# Patient Record
Sex: FEMALE | Race: WHITE | NOT HISPANIC OR LATINO | Employment: UNEMPLOYED | URBAN - NONMETROPOLITAN AREA
[De-identification: names, ages, dates, MRNs, and addresses within clinical notes are randomized per-mention and may not be internally consistent; named-entity substitution may affect disease eponyms.]

---

## 2023-01-29 ENCOUNTER — APPOINTMENT (OUTPATIENT)
Dept: GENERAL RADIOLOGY | Facility: HOSPITAL | Age: 83
DRG: 871 | End: 2023-01-29
Payer: MEDICARE

## 2023-01-29 ENCOUNTER — APPOINTMENT (OUTPATIENT)
Dept: CT IMAGING | Facility: HOSPITAL | Age: 83
DRG: 871 | End: 2023-01-29
Payer: MEDICARE

## 2023-01-29 ENCOUNTER — HOSPITAL ENCOUNTER (INPATIENT)
Facility: HOSPITAL | Age: 83
LOS: 5 days | Discharge: NURSING, REHAB OR LONG-TERM CARE | DRG: 871 | End: 2023-02-03
Attending: STUDENT IN AN ORGANIZED HEALTH CARE EDUCATION/TRAINING PROGRAM | Admitting: INTERNAL MEDICINE
Payer: MEDICARE

## 2023-01-29 DIAGNOSIS — G93.41 ACUTE METABOLIC ENCEPHALOPATHY: ICD-10-CM

## 2023-01-29 DIAGNOSIS — U07.1 COVID-19: ICD-10-CM

## 2023-01-29 DIAGNOSIS — N30.01 ACUTE CYSTITIS WITH HEMATURIA: ICD-10-CM

## 2023-01-29 DIAGNOSIS — H60.501 ACUTE OTITIS EXTERNA OF RIGHT EAR, UNSPECIFIED TYPE: Primary | ICD-10-CM

## 2023-01-29 PROBLEM — N39.0 UTI (URINARY TRACT INFECTION): Status: ACTIVE | Noted: 2023-01-29

## 2023-01-29 LAB
ACETONE BLD QL: NEGATIVE
ALBUMIN SERPL-MCNC: 2.8 G/DL (ref 3.5–5.2)
ALBUMIN SERPL-MCNC: 3.3 G/DL (ref 3.5–5.2)
ALBUMIN/GLOB SERPL: 0.7 G/DL
ALBUMIN/GLOB SERPL: 0.9 G/DL
ALP SERPL-CCNC: 63 U/L (ref 39–117)
ALP SERPL-CCNC: 75 U/L (ref 39–117)
ALT SERPL W P-5'-P-CCNC: 6 U/L (ref 1–33)
ALT SERPL W P-5'-P-CCNC: <5 U/L (ref 1–33)
ANION GAP SERPL CALCULATED.3IONS-SCNC: 11.3 MMOL/L (ref 5–15)
ANION GAP SERPL CALCULATED.3IONS-SCNC: 14.8 MMOL/L (ref 5–15)
APTT PPP: 22.3 SECONDS (ref 26.5–34.5)
AST SERPL-CCNC: 15 U/L (ref 1–32)
AST SERPL-CCNC: 18 U/L (ref 1–32)
BACTERIA UR QL AUTO: ABNORMAL /HPF
BASOPHILS # BLD AUTO: 0 10*3/MM3 (ref 0–0.2)
BASOPHILS # BLD AUTO: 0.01 10*3/MM3 (ref 0–0.2)
BASOPHILS NFR BLD AUTO: 0 % (ref 0–1.5)
BASOPHILS NFR BLD AUTO: 0.1 % (ref 0–1.5)
BILIRUB SERPL-MCNC: 0.4 MG/DL (ref 0–1.2)
BILIRUB SERPL-MCNC: 0.7 MG/DL (ref 0–1.2)
BILIRUB UR QL STRIP: NEGATIVE
BUN SERPL-MCNC: 15 MG/DL (ref 8–23)
BUN SERPL-MCNC: 16 MG/DL (ref 8–23)
BUN/CREAT SERPL: 17.6 (ref 7–25)
BUN/CREAT SERPL: 18.8 (ref 7–25)
CALCIUM SPEC-SCNC: 8.8 MG/DL (ref 8.6–10.5)
CALCIUM SPEC-SCNC: 9.1 MG/DL (ref 8.6–10.5)
CHLORIDE SERPL-SCNC: 95 MMOL/L (ref 98–107)
CHLORIDE SERPL-SCNC: 96 MMOL/L (ref 98–107)
CK SERPL-CCNC: 282 U/L (ref 20–180)
CLARITY UR: ABNORMAL
CO2 SERPL-SCNC: 25.2 MMOL/L (ref 22–29)
CO2 SERPL-SCNC: 28.7 MMOL/L (ref 22–29)
COLOR UR: YELLOW
CREAT SERPL-MCNC: 0.85 MG/DL (ref 0.57–1)
CREAT SERPL-MCNC: 0.85 MG/DL (ref 0.57–1)
CRP SERPL-MCNC: 14.58 MG/DL (ref 0–0.5)
CRP SERPL-MCNC: 16.58 MG/DL (ref 0–0.5)
D-LACTATE SERPL-SCNC: 1.4 MMOL/L (ref 0.5–2)
D-LACTATE SERPL-SCNC: 2.1 MMOL/L (ref 0.5–2)
D-LACTATE SERPL-SCNC: 2.3 MMOL/L (ref 0.5–2)
DEPRECATED RDW RBC AUTO: 43.7 FL (ref 37–54)
DEPRECATED RDW RBC AUTO: 44.6 FL (ref 37–54)
EGFRCR SERPLBLD CKD-EPI 2021: 68.9 ML/MIN/1.73
EGFRCR SERPLBLD CKD-EPI 2021: 68.9 ML/MIN/1.73
EOSINOPHIL # BLD AUTO: 0 10*3/MM3 (ref 0–0.4)
EOSINOPHIL # BLD AUTO: 0.01 10*3/MM3 (ref 0–0.4)
EOSINOPHIL NFR BLD AUTO: 0 % (ref 0.3–6.2)
EOSINOPHIL NFR BLD AUTO: 0.1 % (ref 0.3–6.2)
ERYTHROCYTE [DISTWIDTH] IN BLOOD BY AUTOMATED COUNT: 12.5 % (ref 12.3–15.4)
ERYTHROCYTE [DISTWIDTH] IN BLOOD BY AUTOMATED COUNT: 12.5 % (ref 12.3–15.4)
FLUAV RNA RESP QL NAA+PROBE: NOT DETECTED
FLUBV RNA RESP QL NAA+PROBE: NOT DETECTED
FOLATE SERPL-MCNC: 17.6 NG/ML (ref 4.78–24.2)
GLOBULIN UR ELPH-MCNC: 3.6 GM/DL
GLOBULIN UR ELPH-MCNC: 3.9 GM/DL
GLUCOSE BLDC GLUCOMTR-MCNC: 182 MG/DL (ref 70–130)
GLUCOSE BLDC GLUCOMTR-MCNC: 236 MG/DL (ref 70–130)
GLUCOSE BLDC GLUCOMTR-MCNC: 247 MG/DL (ref 70–130)
GLUCOSE BLDC GLUCOMTR-MCNC: 299 MG/DL (ref 70–130)
GLUCOSE SERPL-MCNC: 214 MG/DL (ref 65–99)
GLUCOSE SERPL-MCNC: 224 MG/DL (ref 65–99)
GLUCOSE UR STRIP-MCNC: ABNORMAL MG/DL
HBA1C MFR BLD: 7.6 % (ref 4.8–5.6)
HCT VFR BLD AUTO: 42 % (ref 34–46.6)
HCT VFR BLD AUTO: 42.7 % (ref 34–46.6)
HGB BLD-MCNC: 13.9 G/DL (ref 12–15.9)
HGB BLD-MCNC: 14.7 G/DL (ref 12–15.9)
HGB UR QL STRIP.AUTO: ABNORMAL
HYALINE CASTS UR QL AUTO: ABNORMAL /LPF
IMM GRANULOCYTES # BLD AUTO: 0.03 10*3/MM3 (ref 0–0.05)
IMM GRANULOCYTES # BLD AUTO: 0.04 10*3/MM3 (ref 0–0.05)
IMM GRANULOCYTES NFR BLD AUTO: 0.3 % (ref 0–0.5)
IMM GRANULOCYTES NFR BLD AUTO: 0.4 % (ref 0–0.5)
INR PPP: 1.04 (ref 0.9–1.1)
KETONES UR QL STRIP: ABNORMAL
LEUKOCYTE ESTERASE UR QL STRIP.AUTO: ABNORMAL
LYMPHOCYTES # BLD AUTO: 0.8 10*3/MM3 (ref 0.7–3.1)
LYMPHOCYTES # BLD AUTO: 0.8 10*3/MM3 (ref 0.7–3.1)
LYMPHOCYTES NFR BLD AUTO: 7.1 % (ref 19.6–45.3)
LYMPHOCYTES NFR BLD AUTO: 8 % (ref 19.6–45.3)
MAGNESIUM SERPL-MCNC: 1.6 MG/DL (ref 1.6–2.4)
MCH RBC QN AUTO: 32.1 PG (ref 26.6–33)
MCH RBC QN AUTO: 32.7 PG (ref 26.6–33)
MCHC RBC AUTO-ENTMCNC: 33.1 G/DL (ref 31.5–35.7)
MCHC RBC AUTO-ENTMCNC: 34.4 G/DL (ref 31.5–35.7)
MCV RBC AUTO: 95.1 FL (ref 79–97)
MCV RBC AUTO: 97 FL (ref 79–97)
MONOCYTES # BLD AUTO: 1.02 10*3/MM3 (ref 0.1–0.9)
MONOCYTES # BLD AUTO: 1.04 10*3/MM3 (ref 0.1–0.9)
MONOCYTES NFR BLD AUTO: 10.2 % (ref 5–12)
MONOCYTES NFR BLD AUTO: 9.3 % (ref 5–12)
NEUTROPHILS NFR BLD AUTO: 8.17 10*3/MM3 (ref 1.7–7)
NEUTROPHILS NFR BLD AUTO: 81.4 % (ref 42.7–76)
NEUTROPHILS NFR BLD AUTO: 83.1 % (ref 42.7–76)
NEUTROPHILS NFR BLD AUTO: 9.31 10*3/MM3 (ref 1.7–7)
NITRITE UR QL STRIP: NEGATIVE
NRBC BLD AUTO-RTO: 0 /100 WBC (ref 0–0.2)
NRBC BLD AUTO-RTO: 0 /100 WBC (ref 0–0.2)
PH UR STRIP.AUTO: 6 [PH] (ref 5–8)
PLATELET # BLD AUTO: 195 10*3/MM3 (ref 140–450)
PLATELET # BLD AUTO: 215 10*3/MM3 (ref 140–450)
PMV BLD AUTO: 10.5 FL (ref 6–12)
PMV BLD AUTO: 9.8 FL (ref 6–12)
POTASSIUM SERPL-SCNC: 3.6 MMOL/L (ref 3.5–5.2)
POTASSIUM SERPL-SCNC: 3.9 MMOL/L (ref 3.5–5.2)
PROT SERPL-MCNC: 6.7 G/DL (ref 6–8.5)
PROT SERPL-MCNC: 6.9 G/DL (ref 6–8.5)
PROT UR QL STRIP: ABNORMAL
PROTHROMBIN TIME: 13.9 SECONDS (ref 12.1–14.7)
QT INTERVAL: 370 MS
QTC INTERVAL: 474 MS
RBC # BLD AUTO: 4.33 10*6/MM3 (ref 3.77–5.28)
RBC # BLD AUTO: 4.49 10*6/MM3 (ref 3.77–5.28)
RBC # UR STRIP: ABNORMAL /HPF
REF LAB TEST METHOD: ABNORMAL
SARS-COV-2 RNA RESP QL NAA+PROBE: DETECTED
SODIUM SERPL-SCNC: 135 MMOL/L (ref 136–145)
SODIUM SERPL-SCNC: 136 MMOL/L (ref 136–145)
SP GR UR STRIP: 1.02 (ref 1–1.03)
SQUAMOUS #/AREA URNS HPF: ABNORMAL /HPF
TSH SERPL DL<=0.05 MIU/L-ACNC: 1.3 UIU/ML (ref 0.27–4.2)
UROBILINOGEN UR QL STRIP: ABNORMAL
VIT B12 BLD-MCNC: 213 PG/ML (ref 211–946)
WBC # UR STRIP: ABNORMAL /HPF
WBC NRBC COR # BLD: 10.03 10*3/MM3 (ref 3.4–10.8)
WBC NRBC COR # BLD: 11.2 10*3/MM3 (ref 3.4–10.8)

## 2023-01-29 PROCEDURE — 85610 PROTHROMBIN TIME: CPT | Performed by: STUDENT IN AN ORGANIZED HEALTH CARE EDUCATION/TRAINING PROGRAM

## 2023-01-29 PROCEDURE — 82962 GLUCOSE BLOOD TEST: CPT

## 2023-01-29 PROCEDURE — 87040 BLOOD CULTURE FOR BACTERIA: CPT | Performed by: STUDENT IN AN ORGANIZED HEALTH CARE EDUCATION/TRAINING PROGRAM

## 2023-01-29 PROCEDURE — 86140 C-REACTIVE PROTEIN: CPT | Performed by: STUDENT IN AN ORGANIZED HEALTH CARE EDUCATION/TRAINING PROGRAM

## 2023-01-29 PROCEDURE — 82009 KETONE BODYS QUAL: CPT | Performed by: PHYSICIAN ASSISTANT

## 2023-01-29 PROCEDURE — 25010000002 CEFTRIAXONE PER 250 MG: Performed by: STUDENT IN AN ORGANIZED HEALTH CARE EDUCATION/TRAINING PROGRAM

## 2023-01-29 PROCEDURE — 85025 COMPLETE CBC W/AUTO DIFF WBC: CPT | Performed by: INTERNAL MEDICINE

## 2023-01-29 PROCEDURE — 82550 ASSAY OF CK (CPK): CPT | Performed by: STUDENT IN AN ORGANIZED HEALTH CARE EDUCATION/TRAINING PROGRAM

## 2023-01-29 PROCEDURE — 87636 SARSCOV2 & INF A&B AMP PRB: CPT | Performed by: STUDENT IN AN ORGANIZED HEALTH CARE EDUCATION/TRAINING PROGRAM

## 2023-01-29 PROCEDURE — 84443 ASSAY THYROID STIM HORMONE: CPT | Performed by: INTERNAL MEDICINE

## 2023-01-29 PROCEDURE — 93010 ELECTROCARDIOGRAM REPORT: CPT | Performed by: INTERNAL MEDICINE

## 2023-01-29 PROCEDURE — 80053 COMPREHEN METABOLIC PANEL: CPT | Performed by: STUDENT IN AN ORGANIZED HEALTH CARE EDUCATION/TRAINING PROGRAM

## 2023-01-29 PROCEDURE — 99223 1ST HOSP IP/OBS HIGH 75: CPT | Performed by: PHYSICIAN ASSISTANT

## 2023-01-29 PROCEDURE — 83036 HEMOGLOBIN GLYCOSYLATED A1C: CPT | Performed by: INTERNAL MEDICINE

## 2023-01-29 PROCEDURE — 99285 EMERGENCY DEPT VISIT HI MDM: CPT

## 2023-01-29 PROCEDURE — 85025 COMPLETE CBC W/AUTO DIFF WBC: CPT | Performed by: STUDENT IN AN ORGANIZED HEALTH CARE EDUCATION/TRAINING PROGRAM

## 2023-01-29 PROCEDURE — 36415 COLL VENOUS BLD VENIPUNCTURE: CPT

## 2023-01-29 PROCEDURE — 25010000002 HEPARIN (PORCINE) PER 1000 UNITS: Performed by: INTERNAL MEDICINE

## 2023-01-29 PROCEDURE — 86140 C-REACTIVE PROTEIN: CPT | Performed by: PHYSICIAN ASSISTANT

## 2023-01-29 PROCEDURE — 93005 ELECTROCARDIOGRAM TRACING: CPT | Performed by: STUDENT IN AN ORGANIZED HEALTH CARE EDUCATION/TRAINING PROGRAM

## 2023-01-29 PROCEDURE — 80053 COMPREHEN METABOLIC PANEL: CPT | Performed by: INTERNAL MEDICINE

## 2023-01-29 PROCEDURE — 87086 URINE CULTURE/COLONY COUNT: CPT | Performed by: STUDENT IN AN ORGANIZED HEALTH CARE EDUCATION/TRAINING PROGRAM

## 2023-01-29 PROCEDURE — 87186 SC STD MICRODIL/AGAR DIL: CPT | Performed by: STUDENT IN AN ORGANIZED HEALTH CARE EDUCATION/TRAINING PROGRAM

## 2023-01-29 PROCEDURE — 82746 ASSAY OF FOLIC ACID SERUM: CPT | Performed by: INTERNAL MEDICINE

## 2023-01-29 PROCEDURE — 82607 VITAMIN B-12: CPT | Performed by: INTERNAL MEDICINE

## 2023-01-29 PROCEDURE — P9612 CATHETERIZE FOR URINE SPEC: HCPCS

## 2023-01-29 PROCEDURE — 71045 X-RAY EXAM CHEST 1 VIEW: CPT

## 2023-01-29 PROCEDURE — 81001 URINALYSIS AUTO W/SCOPE: CPT | Performed by: STUDENT IN AN ORGANIZED HEALTH CARE EDUCATION/TRAINING PROGRAM

## 2023-01-29 PROCEDURE — 85730 THROMBOPLASTIN TIME PARTIAL: CPT | Performed by: STUDENT IN AN ORGANIZED HEALTH CARE EDUCATION/TRAINING PROGRAM

## 2023-01-29 PROCEDURE — 83605 ASSAY OF LACTIC ACID: CPT | Performed by: STUDENT IN AN ORGANIZED HEALTH CARE EDUCATION/TRAINING PROGRAM

## 2023-01-29 PROCEDURE — 83735 ASSAY OF MAGNESIUM: CPT | Performed by: PHYSICIAN ASSISTANT

## 2023-01-29 PROCEDURE — 70450 CT HEAD/BRAIN W/O DYE: CPT

## 2023-01-29 RX ORDER — LABETALOL HYDROCHLORIDE 5 MG/ML
20 INJECTION, SOLUTION INTRAVENOUS ONCE
Status: COMPLETED | OUTPATIENT
Start: 2023-01-29 | End: 2023-01-29

## 2023-01-29 RX ORDER — NEOMYCIN SULFATE, POLYMYXIN B SULFATE AND HYDROCORTISONE 10; 3.5; 1 MG/ML; MG/ML; [USP'U]/ML
4 SUSPENSION/ DROPS AURICULAR (OTIC) EVERY 6 HOURS SCHEDULED
Status: DISCONTINUED | OUTPATIENT
Start: 2023-01-29 | End: 2023-02-03 | Stop reason: HOSPADM

## 2023-01-29 RX ORDER — ACETAMINOPHEN 325 MG/1
650 TABLET ORAL EVERY 6 HOURS PRN
Status: DISCONTINUED | OUTPATIENT
Start: 2023-01-29 | End: 2023-02-03 | Stop reason: HOSPADM

## 2023-01-29 RX ORDER — SODIUM CHLORIDE 0.9 % (FLUSH) 0.9 %
10 SYRINGE (ML) INJECTION AS NEEDED
Status: DISCONTINUED | OUTPATIENT
Start: 2023-01-29 | End: 2023-02-03 | Stop reason: HOSPADM

## 2023-01-29 RX ORDER — CARVEDILOL 25 MG/1
25 TABLET ORAL DAILY
Status: DISCONTINUED | OUTPATIENT
Start: 2023-01-29 | End: 2023-02-03 | Stop reason: HOSPADM

## 2023-01-29 RX ORDER — HYDROXYZINE HYDROCHLORIDE 25 MG/1
25 TABLET, FILM COATED ORAL 3 TIMES DAILY PRN
Status: DISCONTINUED | OUTPATIENT
Start: 2023-01-29 | End: 2023-02-03 | Stop reason: HOSPADM

## 2023-01-29 RX ORDER — SODIUM CHLORIDE 9 MG/ML
40 INJECTION, SOLUTION INTRAVENOUS AS NEEDED
Status: DISCONTINUED | OUTPATIENT
Start: 2023-01-29 | End: 2023-02-03 | Stop reason: HOSPADM

## 2023-01-29 RX ORDER — CHOLECALCIFEROL (VITAMIN D3) 125 MCG
10 CAPSULE ORAL NIGHTLY PRN
Status: DISCONTINUED | OUTPATIENT
Start: 2023-01-29 | End: 2023-02-03 | Stop reason: HOSPADM

## 2023-01-29 RX ORDER — LISINOPRIL 40 MG/1
40 TABLET ORAL DAILY
COMMUNITY

## 2023-01-29 RX ORDER — METOPROLOL TARTRATE 5 MG/5ML
5 INJECTION INTRAVENOUS EVERY 6 HOURS PRN
Status: DISCONTINUED | OUTPATIENT
Start: 2023-01-29 | End: 2023-01-29

## 2023-01-29 RX ORDER — DIPHENOXYLATE HYDROCHLORIDE AND ATROPINE SULFATE 2.5; .025 MG/1; MG/1
1 TABLET ORAL DAILY
Status: DISCONTINUED | OUTPATIENT
Start: 2023-01-29 | End: 2023-02-03 | Stop reason: HOSPADM

## 2023-01-29 RX ORDER — FLUTICASONE PROPIONATE 50 MCG
2 SPRAY, SUSPENSION (ML) NASAL DAILY
Status: DISCONTINUED | OUTPATIENT
Start: 2023-01-29 | End: 2023-02-03 | Stop reason: HOSPADM

## 2023-01-29 RX ORDER — SODIUM CHLORIDE 0.9 % (FLUSH) 0.9 %
10 SYRINGE (ML) INJECTION EVERY 12 HOURS SCHEDULED
Status: DISCONTINUED | OUTPATIENT
Start: 2023-01-29 | End: 2023-02-03 | Stop reason: HOSPADM

## 2023-01-29 RX ORDER — LABETALOL HYDROCHLORIDE 5 MG/ML
40 INJECTION, SOLUTION INTRAVENOUS ONCE
Status: COMPLETED | OUTPATIENT
Start: 2023-01-29 | End: 2023-01-29

## 2023-01-29 RX ORDER — LABETALOL HYDROCHLORIDE 5 MG/ML
10 INJECTION, SOLUTION INTRAVENOUS EVERY 6 HOURS PRN
Status: DISCONTINUED | OUTPATIENT
Start: 2023-01-29 | End: 2023-02-03 | Stop reason: HOSPADM

## 2023-01-29 RX ORDER — GUAIFENESIN/DEXTROMETHORPHAN 100-10MG/5
5 SYRUP ORAL EVERY 4 HOURS PRN
Status: DISCONTINUED | OUTPATIENT
Start: 2023-01-29 | End: 2023-02-03 | Stop reason: HOSPADM

## 2023-01-29 RX ORDER — NICOTINE POLACRILEX 4 MG
15 LOZENGE BUCCAL
Status: DISCONTINUED | OUTPATIENT
Start: 2023-01-29 | End: 2023-01-30

## 2023-01-29 RX ORDER — PANTOPRAZOLE SODIUM 40 MG/1
40 TABLET, DELAYED RELEASE ORAL
Status: DISCONTINUED | OUTPATIENT
Start: 2023-01-29 | End: 2023-02-03 | Stop reason: HOSPADM

## 2023-01-29 RX ORDER — CARVEDILOL 25 MG/1
25 TABLET ORAL DAILY
COMMUNITY

## 2023-01-29 RX ORDER — CALCIUM CARBONATE 200(500)MG
2 TABLET,CHEWABLE ORAL 3 TIMES DAILY PRN
Status: DISCONTINUED | OUTPATIENT
Start: 2023-01-29 | End: 2023-02-03 | Stop reason: HOSPADM

## 2023-01-29 RX ORDER — BENZONATATE 100 MG/1
100 CAPSULE ORAL 3 TIMES DAILY PRN
Status: DISCONTINUED | OUTPATIENT
Start: 2023-01-29 | End: 2023-02-03 | Stop reason: HOSPADM

## 2023-01-29 RX ORDER — LISINOPRIL 10 MG/1
40 TABLET ORAL DAILY
Status: DISCONTINUED | OUTPATIENT
Start: 2023-01-29 | End: 2023-02-03 | Stop reason: HOSPADM

## 2023-01-29 RX ORDER — HEPARIN SODIUM 5000 [USP'U]/ML
5000 INJECTION, SOLUTION INTRAVENOUS; SUBCUTANEOUS EVERY 12 HOURS SCHEDULED
Status: DISCONTINUED | OUTPATIENT
Start: 2023-01-29 | End: 2023-01-31

## 2023-01-29 RX ORDER — DEXTROSE MONOHYDRATE 25 G/50ML
25 INJECTION, SOLUTION INTRAVENOUS
Status: DISCONTINUED | OUTPATIENT
Start: 2023-01-29 | End: 2023-01-30

## 2023-01-29 RX ADMIN — NEOMYCIN SULFATE, POLYMYXIN B SULFATE AND HYDROCORTISONE 4 DROP: 10; 3.5; 1 SUSPENSION/ DROPS AURICULAR (OTIC) at 23:40

## 2023-01-29 RX ADMIN — SODIUM CHLORIDE 2 G: 9 INJECTION, SOLUTION INTRAVENOUS at 03:53

## 2023-01-29 RX ADMIN — HEPARIN SODIUM 5000 UNITS: 5000 INJECTION INTRAVENOUS; SUBCUTANEOUS at 08:30

## 2023-01-29 RX ADMIN — FLUTICASONE PROPIONATE 2 SPRAY: 50 SPRAY, METERED NASAL at 08:28

## 2023-01-29 RX ADMIN — HEPARIN SODIUM 5000 UNITS: 5000 INJECTION INTRAVENOUS; SUBCUTANEOUS at 20:11

## 2023-01-29 RX ADMIN — NEOMYCIN SULFATE, POLYMYXIN B SULFATE AND HYDROCORTISONE 4 DROP: 10; 3.5; 1 SUSPENSION/ DROPS AURICULAR (OTIC) at 08:28

## 2023-01-29 RX ADMIN — SODIUM CHLORIDE, POTASSIUM CHLORIDE, SODIUM LACTATE AND CALCIUM CHLORIDE 500 ML: 600; 310; 30; 20 INJECTION, SOLUTION INTRAVENOUS at 04:28

## 2023-01-29 RX ADMIN — METOPROLOL TARTRATE 5 MG: 1 INJECTION, SOLUTION INTRAVENOUS at 07:33

## 2023-01-29 RX ADMIN — SODIUM CHLORIDE, POTASSIUM CHLORIDE, SODIUM LACTATE AND CALCIUM CHLORIDE 500 ML: 600; 310; 30; 20 INJECTION, SOLUTION INTRAVENOUS at 02:50

## 2023-01-29 RX ADMIN — NEOMYCIN SULFATE, POLYMYXIN B SULFATE AND HYDROCORTISONE 4 DROP: 10; 3.5; 1 SUSPENSION/ DROPS AURICULAR (OTIC) at 17:21

## 2023-01-29 RX ADMIN — CARVEDILOL 25 MG: 25 TABLET, FILM COATED ORAL at 20:11

## 2023-01-29 RX ADMIN — SODIUM CHLORIDE, PRESERVATIVE FREE 3 ML: 5 INJECTION INTRAVENOUS at 08:30

## 2023-01-29 RX ADMIN — SODIUM CHLORIDE, PRESERVATIVE FREE 10 ML: 5 INJECTION INTRAVENOUS at 21:21

## 2023-01-29 RX ADMIN — NEOMYCIN SULFATE, POLYMYXIN B SULFATE AND HYDROCORTISONE 4 DROP: 10; 3.5; 1 SUSPENSION/ DROPS AURICULAR (OTIC) at 12:01

## 2023-01-29 RX ADMIN — NEOMYCIN SULFATE, POLYMYXIN B SULFATE AND HYDROCORTISONE 4 DROP: 10; 3.5; 1 SUSPENSION/ DROPS AURICULAR (OTIC) at 02:15

## 2023-01-29 RX ADMIN — ACETAMINOPHEN 650 MG: 325 TABLET ORAL at 23:44

## 2023-01-29 RX ADMIN — Medication 20 MG: at 02:13

## 2023-01-29 RX ADMIN — Medication 10 MG: at 12:00

## 2023-01-29 RX ADMIN — LISINOPRIL 40 MG: 10 TABLET ORAL at 20:11

## 2023-01-29 RX ADMIN — Medication 20 MG: at 04:21

## 2023-01-29 RX ADMIN — ACETAMINOPHEN 650 MG: 325 TABLET ORAL at 14:41

## 2023-01-30 LAB
ANION GAP SERPL CALCULATED.3IONS-SCNC: 10 MMOL/L (ref 5–15)
BUN SERPL-MCNC: 24 MG/DL (ref 8–23)
BUN/CREAT SERPL: 26.4 (ref 7–25)
CALCIUM SPEC-SCNC: 8.2 MG/DL (ref 8.6–10.5)
CHLORIDE SERPL-SCNC: 95 MMOL/L (ref 98–107)
CO2 SERPL-SCNC: 28 MMOL/L (ref 22–29)
CREAT SERPL-MCNC: 0.91 MG/DL (ref 0.57–1)
CRP SERPL-MCNC: 30.35 MG/DL (ref 0–0.5)
D DIMER PPP FEU-MCNC: 4.32 MCGFEU/ML (ref 0–0.81)
DEPRECATED RDW RBC AUTO: 45.3 FL (ref 37–54)
EGFRCR SERPLBLD CKD-EPI 2021: 63.5 ML/MIN/1.73
ERYTHROCYTE [DISTWIDTH] IN BLOOD BY AUTOMATED COUNT: 12.6 % (ref 12.3–15.4)
FERRITIN SERPL-MCNC: 386.2 NG/ML (ref 13–150)
GLUCOSE BLDC GLUCOMTR-MCNC: 152 MG/DL (ref 70–130)
GLUCOSE BLDC GLUCOMTR-MCNC: 180 MG/DL (ref 70–130)
GLUCOSE BLDC GLUCOMTR-MCNC: 193 MG/DL (ref 70–130)
GLUCOSE BLDC GLUCOMTR-MCNC: 202 MG/DL (ref 70–130)
GLUCOSE SERPL-MCNC: 172 MG/DL (ref 65–99)
HCT VFR BLD AUTO: 35.3 % (ref 34–46.6)
HGB BLD-MCNC: 11.7 G/DL (ref 12–15.9)
MCH RBC QN AUTO: 32.2 PG (ref 26.6–33)
MCHC RBC AUTO-ENTMCNC: 33.1 G/DL (ref 31.5–35.7)
MCV RBC AUTO: 97.2 FL (ref 79–97)
PLATELET # BLD AUTO: 150 10*3/MM3 (ref 140–450)
PMV BLD AUTO: 10.8 FL (ref 6–12)
POTASSIUM SERPL-SCNC: 3.4 MMOL/L (ref 3.5–5.2)
PROCALCITONIN SERPL-MCNC: 0.38 NG/ML (ref 0–0.25)
RBC # BLD AUTO: 3.63 10*6/MM3 (ref 3.77–5.28)
SODIUM SERPL-SCNC: 133 MMOL/L (ref 136–145)
WBC NRBC COR # BLD: 8.63 10*3/MM3 (ref 3.4–10.8)

## 2023-01-30 PROCEDURE — 84145 PROCALCITONIN (PCT): CPT | Performed by: HOSPITALIST

## 2023-01-30 PROCEDURE — 25010000002 HEPARIN (PORCINE) PER 1000 UNITS: Performed by: INTERNAL MEDICINE

## 2023-01-30 PROCEDURE — 86140 C-REACTIVE PROTEIN: CPT | Performed by: HOSPITALIST

## 2023-01-30 PROCEDURE — 92610 EVALUATE SWALLOWING FUNCTION: CPT

## 2023-01-30 PROCEDURE — 82962 GLUCOSE BLOOD TEST: CPT

## 2023-01-30 PROCEDURE — 85379 FIBRIN DEGRADATION QUANT: CPT | Performed by: HOSPITALIST

## 2023-01-30 PROCEDURE — 97162 PT EVAL MOD COMPLEX 30 MIN: CPT

## 2023-01-30 PROCEDURE — 82728 ASSAY OF FERRITIN: CPT | Performed by: HOSPITALIST

## 2023-01-30 PROCEDURE — 63710000001 INSULIN ASPART PER 5 UNITS: Performed by: HOSPITALIST

## 2023-01-30 PROCEDURE — 99232 SBSQ HOSP IP/OBS MODERATE 35: CPT | Performed by: HOSPITALIST

## 2023-01-30 PROCEDURE — 97110 THERAPEUTIC EXERCISES: CPT

## 2023-01-30 PROCEDURE — 25010000002 CEFTRIAXONE PER 250 MG: Performed by: PHYSICIAN ASSISTANT

## 2023-01-30 PROCEDURE — 80048 BASIC METABOLIC PNL TOTAL CA: CPT | Performed by: INTERNAL MEDICINE

## 2023-01-30 PROCEDURE — 97167 OT EVAL HIGH COMPLEX 60 MIN: CPT

## 2023-01-30 PROCEDURE — 85027 COMPLETE CBC AUTOMATED: CPT | Performed by: INTERNAL MEDICINE

## 2023-01-30 RX ORDER — NICOTINE POLACRILEX 4 MG
15 LOZENGE BUCCAL
Status: DISCONTINUED | OUTPATIENT
Start: 2023-01-30 | End: 2023-02-03 | Stop reason: HOSPADM

## 2023-01-30 RX ORDER — INSULIN ASPART 100 [IU]/ML
0-7 INJECTION, SOLUTION INTRAVENOUS; SUBCUTANEOUS
Status: DISCONTINUED | OUTPATIENT
Start: 2023-01-30 | End: 2023-02-03 | Stop reason: HOSPADM

## 2023-01-30 RX ORDER — DEXTROSE MONOHYDRATE 25 G/50ML
25 INJECTION, SOLUTION INTRAVENOUS
Status: DISCONTINUED | OUTPATIENT
Start: 2023-01-30 | End: 2023-02-03 | Stop reason: HOSPADM

## 2023-01-30 RX ADMIN — PANTOPRAZOLE SODIUM 40 MG: 40 TABLET, DELAYED RELEASE ORAL at 05:24

## 2023-01-30 RX ADMIN — SODIUM CHLORIDE, PRESERVATIVE FREE 10 ML: 5 INJECTION INTRAVENOUS at 21:50

## 2023-01-30 RX ADMIN — NEOMYCIN SULFATE, POLYMYXIN B SULFATE AND HYDROCORTISONE 4 DROP: 10; 3.5; 1 SUSPENSION/ DROPS AURICULAR (OTIC) at 12:07

## 2023-01-30 RX ADMIN — NEOMYCIN SULFATE, POLYMYXIN B SULFATE AND HYDROCORTISONE 4 DROP: 10; 3.5; 1 SUSPENSION/ DROPS AURICULAR (OTIC) at 17:54

## 2023-01-30 RX ADMIN — HEPARIN SODIUM 5000 UNITS: 5000 INJECTION INTRAVENOUS; SUBCUTANEOUS at 08:30

## 2023-01-30 RX ADMIN — INSULIN ASPART 3 UNITS: 100 INJECTION, SOLUTION INTRAVENOUS; SUBCUTANEOUS at 17:53

## 2023-01-30 RX ADMIN — HEPARIN SODIUM 5000 UNITS: 5000 INJECTION INTRAVENOUS; SUBCUTANEOUS at 21:49

## 2023-01-30 RX ADMIN — FLUTICASONE PROPIONATE 2 SPRAY: 50 SPRAY, METERED NASAL at 08:29

## 2023-01-30 RX ADMIN — CARVEDILOL 25 MG: 25 TABLET, FILM COATED ORAL at 08:29

## 2023-01-30 RX ADMIN — ACETAMINOPHEN 650 MG: 325 TABLET ORAL at 21:49

## 2023-01-30 RX ADMIN — Medication 10 MG: at 21:49

## 2023-01-30 RX ADMIN — Medication 1 TABLET: at 08:29

## 2023-01-30 RX ADMIN — LISINOPRIL 40 MG: 10 TABLET ORAL at 08:29

## 2023-01-30 RX ADMIN — SODIUM CHLORIDE, PRESERVATIVE FREE 10 ML: 5 INJECTION INTRAVENOUS at 08:30

## 2023-01-30 RX ADMIN — NEOMYCIN SULFATE, POLYMYXIN B SULFATE AND HYDROCORTISONE 4 DROP: 10; 3.5; 1 SUSPENSION/ DROPS AURICULAR (OTIC) at 05:24

## 2023-01-30 RX ADMIN — CEFTRIAXONE 1 G: 1 INJECTION, POWDER, FOR SOLUTION INTRAMUSCULAR; INTRAVENOUS at 03:09

## 2023-01-31 ENCOUNTER — APPOINTMENT (OUTPATIENT)
Dept: ULTRASOUND IMAGING | Facility: HOSPITAL | Age: 83
DRG: 871 | End: 2023-01-31
Payer: MEDICARE

## 2023-01-31 ENCOUNTER — APPOINTMENT (OUTPATIENT)
Dept: CT IMAGING | Facility: HOSPITAL | Age: 83
DRG: 871 | End: 2023-01-31
Payer: MEDICARE

## 2023-01-31 ENCOUNTER — TELEPHONE (OUTPATIENT)
Dept: GENERAL RADIOLOGY | Facility: HOSPITAL | Age: 83
End: 2023-01-31
Payer: MEDICARE

## 2023-01-31 LAB
APTT PPP: 43.4 SECONDS (ref 26.5–34.5)
APTT PPP: 91.5 SECONDS (ref 26.5–34.5)
BACTERIA SPEC AEROBE CULT: ABNORMAL
BASOPHILS # BLD AUTO: 0 10*3/MM3 (ref 0–0.2)
BASOPHILS NFR BLD AUTO: 0 % (ref 0–1.5)
DEPRECATED RDW RBC AUTO: 43.9 FL (ref 37–54)
EOSINOPHIL # BLD AUTO: 0.13 10*3/MM3 (ref 0–0.4)
EOSINOPHIL NFR BLD AUTO: 1.7 % (ref 0.3–6.2)
ERYTHROCYTE [DISTWIDTH] IN BLOOD BY AUTOMATED COUNT: 12.2 % (ref 12.3–15.4)
GLUCOSE BLDC GLUCOMTR-MCNC: 116 MG/DL (ref 70–130)
GLUCOSE BLDC GLUCOMTR-MCNC: 166 MG/DL (ref 70–130)
GLUCOSE BLDC GLUCOMTR-MCNC: 185 MG/DL (ref 70–130)
HCT VFR BLD AUTO: 38.7 % (ref 34–46.6)
HGB BLD-MCNC: 12.5 G/DL (ref 12–15.9)
IMM GRANULOCYTES # BLD AUTO: 0.02 10*3/MM3 (ref 0–0.05)
IMM GRANULOCYTES NFR BLD AUTO: 0.3 % (ref 0–0.5)
INR PPP: 1.05 (ref 0.9–1.1)
LYMPHOCYTES # BLD AUTO: 0.85 10*3/MM3 (ref 0.7–3.1)
LYMPHOCYTES NFR BLD AUTO: 10.9 % (ref 19.6–45.3)
MCH RBC QN AUTO: 31.3 PG (ref 26.6–33)
MCHC RBC AUTO-ENTMCNC: 32.3 G/DL (ref 31.5–35.7)
MCV RBC AUTO: 97 FL (ref 79–97)
MONOCYTES # BLD AUTO: 0.79 10*3/MM3 (ref 0.1–0.9)
MONOCYTES NFR BLD AUTO: 10.1 % (ref 5–12)
NEUTROPHILS NFR BLD AUTO: 6.03 10*3/MM3 (ref 1.7–7)
NEUTROPHILS NFR BLD AUTO: 77 % (ref 42.7–76)
NRBC BLD AUTO-RTO: 0 /100 WBC (ref 0–0.2)
PLATELET # BLD AUTO: 161 10*3/MM3 (ref 140–450)
PMV BLD AUTO: 10.4 FL (ref 6–12)
PROTHROMBIN TIME: 14 SECONDS (ref 12.1–14.7)
RBC # BLD AUTO: 3.99 10*6/MM3 (ref 3.77–5.28)
WBC NRBC COR # BLD: 7.82 10*3/MM3 (ref 3.4–10.8)

## 2023-01-31 PROCEDURE — 93970 EXTREMITY STUDY: CPT

## 2023-01-31 PROCEDURE — 85025 COMPLETE CBC W/AUTO DIFF WBC: CPT | Performed by: HOSPITALIST

## 2023-01-31 PROCEDURE — 0 IOPAMIDOL PER 1 ML: Performed by: HOSPITALIST

## 2023-01-31 PROCEDURE — 63710000001 INSULIN ASPART PER 5 UNITS: Performed by: HOSPITALIST

## 2023-01-31 PROCEDURE — 71275 CT ANGIOGRAPHY CHEST: CPT | Performed by: RADIOLOGY

## 2023-01-31 PROCEDURE — 25010000002 HEPARIN (PORCINE) PER 1000 UNITS: Performed by: HOSPITALIST

## 2023-01-31 PROCEDURE — 25010000002 ENOXAPARIN PER 10 MG: Performed by: HOSPITALIST

## 2023-01-31 PROCEDURE — 25010000002 CYANOCOBALAMIN PER 1000 MCG: Performed by: HOSPITALIST

## 2023-01-31 PROCEDURE — 93970 EXTREMITY STUDY: CPT | Performed by: RADIOLOGY

## 2023-01-31 PROCEDURE — 25010000002 CEFTRIAXONE PER 250 MG: Performed by: PHYSICIAN ASSISTANT

## 2023-01-31 PROCEDURE — 85610 PROTHROMBIN TIME: CPT | Performed by: HOSPITALIST

## 2023-01-31 PROCEDURE — 71275 CT ANGIOGRAPHY CHEST: CPT

## 2023-01-31 PROCEDURE — 99232 SBSQ HOSP IP/OBS MODERATE 35: CPT | Performed by: HOSPITALIST

## 2023-01-31 PROCEDURE — 85730 THROMBOPLASTIN TIME PARTIAL: CPT | Performed by: HOSPITALIST

## 2023-01-31 PROCEDURE — 82962 GLUCOSE BLOOD TEST: CPT

## 2023-01-31 PROCEDURE — 25010000002 HEPARIN (PORCINE) PER 1000 UNITS: Performed by: INTERNAL MEDICINE

## 2023-01-31 RX ORDER — CYANOCOBALAMIN 1000 UG/ML
1000 INJECTION, SOLUTION INTRAMUSCULAR; SUBCUTANEOUS DAILY
Status: DISCONTINUED | OUTPATIENT
Start: 2023-01-31 | End: 2023-02-03 | Stop reason: HOSPADM

## 2023-01-31 RX ORDER — POTASSIUM CHLORIDE 1.5 G/1.77G
40 POWDER, FOR SOLUTION ORAL AS NEEDED
Status: DISCONTINUED | OUTPATIENT
Start: 2023-01-31 | End: 2023-02-03 | Stop reason: HOSPADM

## 2023-01-31 RX ORDER — MAGNESIUM SULFATE HEPTAHYDRATE 40 MG/ML
4 INJECTION, SOLUTION INTRAVENOUS AS NEEDED
Status: DISCONTINUED | OUTPATIENT
Start: 2023-01-31 | End: 2023-02-03 | Stop reason: HOSPADM

## 2023-01-31 RX ORDER — MAGNESIUM SULFATE HEPTAHYDRATE 40 MG/ML
2 INJECTION, SOLUTION INTRAVENOUS AS NEEDED
Status: DISCONTINUED | OUTPATIENT
Start: 2023-01-31 | End: 2023-02-03 | Stop reason: HOSPADM

## 2023-01-31 RX ORDER — HEPARIN SODIUM 5000 [USP'U]/ML
60 INJECTION, SOLUTION INTRAVENOUS; SUBCUTANEOUS AS NEEDED
Status: ACTIVE | OUTPATIENT
Start: 2023-01-31 | End: 2023-02-02

## 2023-01-31 RX ORDER — HEPARIN SOD,PORCINE/0.9 % NACL 25000/250
12 INTRAVENOUS SOLUTION INTRAVENOUS
Status: DISPENSED | OUTPATIENT
Start: 2023-01-31 | End: 2023-02-02

## 2023-01-31 RX ORDER — HEPARIN SODIUM 5000 [USP'U]/ML
30 INJECTION, SOLUTION INTRAVENOUS; SUBCUTANEOUS AS NEEDED
Status: DISPENSED | OUTPATIENT
Start: 2023-01-31 | End: 2023-02-02

## 2023-01-31 RX ORDER — ENOXAPARIN SODIUM 100 MG/ML
0.5 INJECTION SUBCUTANEOUS EVERY 12 HOURS
Status: DISCONTINUED | OUTPATIENT
Start: 2023-01-31 | End: 2023-01-31

## 2023-01-31 RX ORDER — HEPARIN SODIUM 5000 [USP'U]/ML
60 INJECTION, SOLUTION INTRAVENOUS; SUBCUTANEOUS ONCE
Status: COMPLETED | OUTPATIENT
Start: 2023-01-31 | End: 2023-01-31

## 2023-01-31 RX ORDER — POTASSIUM CHLORIDE 750 MG/1
40 CAPSULE, EXTENDED RELEASE ORAL AS NEEDED
Status: DISCONTINUED | OUTPATIENT
Start: 2023-01-31 | End: 2023-02-03 | Stop reason: HOSPADM

## 2023-01-31 RX ADMIN — PANTOPRAZOLE SODIUM 40 MG: 40 TABLET, DELAYED RELEASE ORAL at 05:25

## 2023-01-31 RX ADMIN — LISINOPRIL 40 MG: 10 TABLET ORAL at 08:33

## 2023-01-31 RX ADMIN — HEPARIN SODIUM 5000 UNITS: 5000 INJECTION INTRAVENOUS; SUBCUTANEOUS at 08:33

## 2023-01-31 RX ADMIN — CEFTRIAXONE 1 G: 1 INJECTION, POWDER, FOR SOLUTION INTRAMUSCULAR; INTRAVENOUS at 04:00

## 2023-01-31 RX ADMIN — NEOMYCIN SULFATE, POLYMYXIN B SULFATE AND HYDROCORTISONE 4 DROP: 10; 3.5; 1 SUSPENSION/ DROPS AURICULAR (OTIC) at 05:25

## 2023-01-31 RX ADMIN — NEOMYCIN SULFATE, POLYMYXIN B SULFATE AND HYDROCORTISONE 4 DROP: 10; 3.5; 1 SUSPENSION/ DROPS AURICULAR (OTIC) at 17:29

## 2023-01-31 RX ADMIN — Medication 1 TABLET: at 08:33

## 2023-01-31 RX ADMIN — NEOMYCIN SULFATE, POLYMYXIN B SULFATE AND HYDROCORTISONE 4 DROP: 10; 3.5; 1 SUSPENSION/ DROPS AURICULAR (OTIC) at 13:06

## 2023-01-31 RX ADMIN — INSULIN ASPART 2 UNITS: 100 INJECTION, SOLUTION INTRAVENOUS; SUBCUTANEOUS at 17:29

## 2023-01-31 RX ADMIN — CYANOCOBALAMIN 1000 MCG: 1000 INJECTION, SOLUTION INTRAMUSCULAR; SUBCUTANEOUS at 13:06

## 2023-01-31 RX ADMIN — HEPARIN SODIUM 12 UNITS/KG/HR: 5000 INJECTION INTRAVENOUS; SUBCUTANEOUS at 15:28

## 2023-01-31 RX ADMIN — NEOMYCIN SULFATE, POLYMYXIN B SULFATE AND HYDROCORTISONE 4 DROP: 10; 3.5; 1 SUSPENSION/ DROPS AURICULAR (OTIC) at 00:30

## 2023-01-31 RX ADMIN — SODIUM CHLORIDE, PRESERVATIVE FREE 10 ML: 5 INJECTION INTRAVENOUS at 21:51

## 2023-01-31 RX ADMIN — HEPARIN SODIUM 4600 UNITS: 5000 INJECTION INTRAVENOUS; SUBCUTANEOUS at 15:28

## 2023-01-31 RX ADMIN — IOPAMIDOL 100 ML: 755 INJECTION, SOLUTION INTRAVENOUS at 12:32

## 2023-01-31 RX ADMIN — INSULIN ASPART 2 UNITS: 100 INJECTION, SOLUTION INTRAVENOUS; SUBCUTANEOUS at 13:06

## 2023-01-31 RX ADMIN — ENOXAPARIN SODIUM 40 MG: 40 INJECTION SUBCUTANEOUS at 13:06

## 2023-01-31 RX ADMIN — SODIUM CHLORIDE, PRESERVATIVE FREE 10 ML: 5 INJECTION INTRAVENOUS at 08:33

## 2023-01-31 RX ADMIN — CARVEDILOL 25 MG: 25 TABLET, FILM COATED ORAL at 08:33

## 2023-01-31 RX ADMIN — FLUTICASONE PROPIONATE 2 SPRAY: 50 SPRAY, METERED NASAL at 08:33

## 2023-02-01 LAB
APTT PPP: 52.8 SECONDS (ref 26.5–34.5)
APTT PPP: 52.9 SECONDS (ref 26.5–34.5)
APTT PPP: 58.2 SECONDS (ref 26.5–34.5)
APTT PPP: 64.9 SECONDS (ref 26.5–34.5)
BASOPHILS # BLD AUTO: 0.01 10*3/MM3 (ref 0–0.2)
BASOPHILS NFR BLD AUTO: 0.1 % (ref 0–1.5)
DEPRECATED RDW RBC AUTO: 42.7 FL (ref 37–54)
EOSINOPHIL # BLD AUTO: 0.13 10*3/MM3 (ref 0–0.4)
EOSINOPHIL NFR BLD AUTO: 1.6 % (ref 0.3–6.2)
ERYTHROCYTE [DISTWIDTH] IN BLOOD BY AUTOMATED COUNT: 12.2 % (ref 12.3–15.4)
GLUCOSE BLDC GLUCOMTR-MCNC: 144 MG/DL (ref 70–130)
GLUCOSE BLDC GLUCOMTR-MCNC: 160 MG/DL (ref 70–130)
GLUCOSE BLDC GLUCOMTR-MCNC: 202 MG/DL (ref 70–130)
GLUCOSE BLDC GLUCOMTR-MCNC: 211 MG/DL (ref 70–130)
HCT VFR BLD AUTO: 37.8 % (ref 34–46.6)
HGB BLD-MCNC: 12.3 G/DL (ref 12–15.9)
IMM GRANULOCYTES # BLD AUTO: 0.03 10*3/MM3 (ref 0–0.05)
IMM GRANULOCYTES NFR BLD AUTO: 0.4 % (ref 0–0.5)
LYMPHOCYTES # BLD AUTO: 1.37 10*3/MM3 (ref 0.7–3.1)
LYMPHOCYTES NFR BLD AUTO: 16.5 % (ref 19.6–45.3)
MCH RBC QN AUTO: 31.1 PG (ref 26.6–33)
MCHC RBC AUTO-ENTMCNC: 32.5 G/DL (ref 31.5–35.7)
MCV RBC AUTO: 95.5 FL (ref 79–97)
MONOCYTES # BLD AUTO: 1.01 10*3/MM3 (ref 0.1–0.9)
MONOCYTES NFR BLD AUTO: 12.2 % (ref 5–12)
NEUTROPHILS NFR BLD AUTO: 5.75 10*3/MM3 (ref 1.7–7)
NEUTROPHILS NFR BLD AUTO: 69.2 % (ref 42.7–76)
NRBC BLD AUTO-RTO: 0 /100 WBC (ref 0–0.2)
PLATELET # BLD AUTO: 199 10*3/MM3 (ref 140–450)
PMV BLD AUTO: 10.8 FL (ref 6–12)
RBC # BLD AUTO: 3.96 10*6/MM3 (ref 3.77–5.28)
WBC NRBC COR # BLD: 8.3 10*3/MM3 (ref 3.4–10.8)

## 2023-02-01 PROCEDURE — 25010000002 CYANOCOBALAMIN PER 1000 MCG: Performed by: HOSPITALIST

## 2023-02-01 PROCEDURE — 25010000002 CEFTRIAXONE PER 250 MG: Performed by: PHYSICIAN ASSISTANT

## 2023-02-01 PROCEDURE — 94799 UNLISTED PULMONARY SVC/PX: CPT

## 2023-02-01 PROCEDURE — 25010000002 FUROSEMIDE PER 20 MG: Performed by: HOSPITALIST

## 2023-02-01 PROCEDURE — 82962 GLUCOSE BLOOD TEST: CPT

## 2023-02-01 PROCEDURE — 97110 THERAPEUTIC EXERCISES: CPT

## 2023-02-01 PROCEDURE — 85730 THROMBOPLASTIN TIME PARTIAL: CPT | Performed by: HOSPITALIST

## 2023-02-01 PROCEDURE — 25010000002 HEPARIN (PORCINE) PER 1000 UNITS: Performed by: HOSPITALIST

## 2023-02-01 PROCEDURE — 63710000001 INSULIN ASPART PER 5 UNITS: Performed by: HOSPITALIST

## 2023-02-01 PROCEDURE — 99232 SBSQ HOSP IP/OBS MODERATE 35: CPT | Performed by: HOSPITALIST

## 2023-02-01 PROCEDURE — 85025 COMPLETE CBC W/AUTO DIFF WBC: CPT | Performed by: HOSPITALIST

## 2023-02-01 RX ORDER — FUROSEMIDE 10 MG/ML
40 INJECTION INTRAMUSCULAR; INTRAVENOUS ONCE
Status: COMPLETED | OUTPATIENT
Start: 2023-02-01 | End: 2023-02-01

## 2023-02-01 RX ADMIN — NEOMYCIN SULFATE, POLYMYXIN B SULFATE AND HYDROCORTISONE 4 DROP: 10; 3.5; 1 SUSPENSION/ DROPS AURICULAR (OTIC) at 17:09

## 2023-02-01 RX ADMIN — FUROSEMIDE 40 MG: 10 INJECTION, SOLUTION INTRAVENOUS at 13:05

## 2023-02-01 RX ADMIN — INSULIN ASPART 3 UNITS: 100 INJECTION, SOLUTION INTRAVENOUS; SUBCUTANEOUS at 17:08

## 2023-02-01 RX ADMIN — HEPARIN SODIUM 2300 UNITS: 5000 INJECTION INTRAVENOUS; SUBCUTANEOUS at 21:45

## 2023-02-01 RX ADMIN — INSULIN ASPART 3 UNITS: 100 INJECTION, SOLUTION INTRAVENOUS; SUBCUTANEOUS at 11:33

## 2023-02-01 RX ADMIN — NEOMYCIN SULFATE, POLYMYXIN B SULFATE AND HYDROCORTISONE 4 DROP: 10; 3.5; 1 SUSPENSION/ DROPS AURICULAR (OTIC) at 05:46

## 2023-02-01 RX ADMIN — CARVEDILOL 25 MG: 25 TABLET, FILM COATED ORAL at 08:38

## 2023-02-01 RX ADMIN — NEOMYCIN SULFATE, POLYMYXIN B SULFATE AND HYDROCORTISONE 4 DROP: 10; 3.5; 1 SUSPENSION/ DROPS AURICULAR (OTIC) at 11:34

## 2023-02-01 RX ADMIN — NEOMYCIN SULFATE, POLYMYXIN B SULFATE AND HYDROCORTISONE 4 DROP: 10; 3.5; 1 SUSPENSION/ DROPS AURICULAR (OTIC) at 23:56

## 2023-02-01 RX ADMIN — CEFTRIAXONE 1 G: 1 INJECTION, POWDER, FOR SOLUTION INTRAMUSCULAR; INTRAVENOUS at 02:33

## 2023-02-01 RX ADMIN — PANTOPRAZOLE SODIUM 40 MG: 40 TABLET, DELAYED RELEASE ORAL at 05:46

## 2023-02-01 RX ADMIN — ACETAMINOPHEN 650 MG: 325 TABLET ORAL at 17:08

## 2023-02-01 RX ADMIN — FLUTICASONE PROPIONATE 2 SPRAY: 50 SPRAY, METERED NASAL at 08:40

## 2023-02-01 RX ADMIN — HEPARIN SODIUM 2300 UNITS: 5000 INJECTION INTRAVENOUS; SUBCUTANEOUS at 13:01

## 2023-02-01 RX ADMIN — NEOMYCIN SULFATE, POLYMYXIN B SULFATE AND HYDROCORTISONE 4 DROP: 10; 3.5; 1 SUSPENSION/ DROPS AURICULAR (OTIC) at 00:02

## 2023-02-01 RX ADMIN — HEPARIN SODIUM 12 UNITS/KG/HR: 5000 INJECTION INTRAVENOUS; SUBCUTANEOUS at 21:40

## 2023-02-01 RX ADMIN — SODIUM CHLORIDE, PRESERVATIVE FREE 10 ML: 5 INJECTION INTRAVENOUS at 21:39

## 2023-02-01 RX ADMIN — LISINOPRIL 40 MG: 10 TABLET ORAL at 08:37

## 2023-02-01 RX ADMIN — SODIUM CHLORIDE, PRESERVATIVE FREE 10 ML: 5 INJECTION INTRAVENOUS at 08:38

## 2023-02-01 RX ADMIN — CYANOCOBALAMIN 1000 MCG: 1000 INJECTION, SOLUTION INTRAMUSCULAR; SUBCUTANEOUS at 08:37

## 2023-02-01 RX ADMIN — Medication 1 TABLET: at 08:38

## 2023-02-01 NOTE — PLAN OF CARE
Goal Outcome Evaluation:  Plan of Care Reviewed With: caregiver, patient        Progress: no change  Outcome Evaluation: Pt. resting in bed at this time. Pt. remains confused to place, time, and situation. Barrier applied to pts. bottom. VSS. No acute changes at this time. Will continue with plan of care.   52488)  Gait Training (12617)  Manual Therapy (52933)  Neuromuscular Re-Education (97312)  Therapeutic Activity (10500)  Therapeutic Exercise (02854)  Electrical Stimulation (24857//55618)   Mechanical Traction (12350)  Ultrasound/Phonophoresis (68710)    Frequency/Duration: 1 times per week for 6 weeks with tapering as the patient progresses    patient involved in and agreed to plan of care and goals.   Attendance policy provided at time of evaluation.        Patient Education:   Who will be receiving education: patient  Are they ready to learn: yes  Preferred learning style: written, verbal, demonstration  Barriers to learning: no barriers apparent at this time   Result of initial outlined education: Verbalizes understanding and Demonstrates understanding    THERAPY DAILY BILLING   Insurance: 1. [employer] 2. N/A    Evaluation Procedures:  No evaluation codes were used on this date of service    Timed Procedures:  Manual Therapy, 20 minutes  Neuromuscular Re-Education, 10 minutes    Untimed Procedures:  No untimed codes were used on this date of service    Total Treatment Time: 30 minutes

## 2023-02-01 NOTE — CASE MANAGEMENT/SOCIAL WORK
Discharge Planning Assessment  [facility]     Patient Name: Vani Gee  MRN: [id number]  Today's Date: 2/1/2023    Admit Date: 1/29/2023    Plan: SS contacted pt's daughter, Natalia [phone] to inform her that [facility] is agreeable to take pt on 2/8/23. Pt's daughter states she would like referral to be faxed to [facility]. Natalia states she will speak with her brother to discuss NH placement. SS faxed referral to [facility] via [email or web address] [place]. SS to follow.   Discharge Plan     Row Name 02/01/23 1716       Plan    Plan SS contacted pt's daughter, Natalia [phone] to inform her that [facility] is agreeable to take pt on 2/8/23. Pt's daughter states she would like referral to be faxed to [facility]. Natalia states she will speak with her brother to discuss NH placement. SS faxed referral to [facility] [email or web address] [place]. SS to follow.                TARIQ Dickey

## 2023-02-01 NOTE — PLAN OF CARE
Goal Outcome Evaluation:  Plan of Care Reviewed With: patient           Outcome Evaluation: Pt resting in bed at this time. Wound care completed. No complaints this shift. Will continue with plan of care.

## 2023-02-01 NOTE — THERAPY DISCHARGE NOTE
Acute Care - Physical Therapy Treatment Note/Discharge   [facility]     Patient Name: Vani Gee  : 1940  MRN: [id number]  Today's Date: 2023   Onset of Illness/Injury or Date of Surgery: 23     Referring Physician: Saritha      Admit Date: 2023    Visit Dx:    ICD-10-CM ICD-9-CM   1. Acute otitis externa of right ear, unspecified type  H60.501 380.10   2. Acute cystitis with hematuria  N30.01 595.0   3. Acute metabolic encephalopathy  G93.41 348.31   4. COVID-19  U07.1 079.89     Patient Active Problem List   Diagnosis   • UTI (urinary tract infection)     Past Medical History:   Diagnosis Date   • Arthritis    • Dementia (HCC)    • Essential hypertension    • Gout      History reviewed. No pertinent surgical history.    PT Assessment (last 12 hours)     PT Evaluation and Treatment     Row Name 23 1255          Physical Therapy Time and Intention    Subjective Information complains of;pain  -AG     Document Type therapy note (daily note)  -AG     Mode of Treatment physical therapy  -AG     Patient Effort poor  -AG     Symptoms Noted During/After Treatment increased pain  -AG     Row Name 23 1255          General Information    Patient Profile Reviewed yes  -AG     Patient Observations poorly cooperative  -AG     Patient/Family/Caregiver Comments/Observations pt. supine, drowsy.  Awakens quickly with LE PROM  Pt. poorly tolerates light touch of B LE and cries out in pain.  -AG     Barriers to Rehab medically complex;physical barrier  -AG     Row Name 23 1255          Pain    Additional Documentation Pain Scale: FACES Pre/Post-Treatment (Group)  -AG     Row Name 23 1259          Pain Scale: FACES Pre/Post-Treatment    Pain: FACES Scale, Pretreatment 0-->no hurt  -AG     Posttreatment Pain Rating 10-->hurts worst  -AG     Pre/Posttreatment Pain Comment B LE pain with light touch of lower legs as well as with mobilization.  Pt. cries out in pain with minimal contact.   -AG     Row Name 02/01/23 1255          Cognition    Behavioral Issues (Cognition) overwhelmed easily  -AG     Row Name 02/01/23 1255          Range of Motion (ROM)    Range of Motion --  B LE PROM tolerated poorly  -AG     Row Name 02/01/23 1255          Bed Mobility    Rolling Left [place] (Bed Mobility) dependent (less than 25% patient effort)  -AG     Rolling Right [place] (Bed Mobility) dependent (less than 25% patient effort)  -AG     Row Name 02/01/23 1255          Motor Skills    Therapeutic Exercise knee;hip;ankle  -AG     Row Name 02/01/23 1255          Hip (Therapeutic Exercise)    Hip PROM (Therapeutic Exercise) bilateral;flexion;extension;aBduction;aDduction  -AG     Row Name 02/01/23 1255          Knee (Therapeutic Exercise)    Knee PROM (Therapeutic Exercise) bilateral;flexion;extension;supine  -AG     Row Name 02/01/23 1255          Ankle (Therapeutic Exercise)    Ankle PROM (Therapeutic Exercise) bilateral;dorsiflexion;supine  -AG     Row Name             Wound 01/30/23 1559 Bilateral medial gluteal MASD (Moisture associated skin damage)    Wound - Properties Group Placement Date: 01/30/23 -TW Placement Time: 1559 -TW Present on Hospital Admission: Y  -TW Side: Bilateral  -TW Orientation: medial  -TW Location: gluteal  -TW Primary Wound Type: MASD  -TW    Retired Wound - Properties Group Placement Date: 01/30/23 -TW Placement Time: 1559  -TW Present on Hospital Admission: Y  -TW Side: Bilateral  -TW Orientation: medial  -TW Location: gluteal  -TW Primary Wound Type: MASD  -TW    Retired Wound - Properties Group Date first assessed: 01/30/23 -TW Time first assessed: 1559 -TW Present on Hospital Admission: Y  -TW Side: Bilateral  -TW Location: gluteal  -TW Primary Wound Type: MASD  -TW    Row Name 02/01/23 1255          Coping    Observed Emotional State tearful/crying  -AG     Verbalized Emotional State acceptance  -AG     Trust Relationship/Rapport care explained;choices  provided;thoughts/feelings acknowledged  -AG     Family/Support Persons family  -AG     Involvement in Care not present at bedside  -AG     Family/Support System Care self-care encouraged  -AG     Row Name 02/01/23 1255          Plan of Care Review    Plan of Care Reviewed With patient  -AG     Outcome Evaluation pt. does not tolerate any LE ROM or mobilization d/t pain.  Plan to d/c from skilled inpt services at this time.  Please reconsult if pt. becomes appropriate.  -AG     Row Name 02/01/23 1255          Positioning and Restraints    Pre-Treatment Position in bed  -AG     Post Treatment Position bed  -AG     In Bed supine;call light within reach;encouraged to call for assist;with nsg;side rails up x3;heels elevated  -AG     Row Name 02/01/23 1255          Progress Summary (PT)    Progress Toward Functional Goals (PT) unable to show any progress toward functional goals  -AG     Row Name 02/01/23 1250          Therapy Plan Review/Discharge Plan (PT)    Therapy Plan Review (PT) evaluation/treatment results reviewed;care plan/treatment goals reviewed;risks/benefits reviewed;current/potential barriers reviewed;participants voiced agreement with care plan;participants included;patient  -AG           User Key  (r) = Recorded By, (t) = Taken By, (c) = Cosigned By    Initials Name Provider Type    TW Elva Mcgraw, RN Registered Nurse    Natalia Olivera, PT Physical Therapist                      PT Recommendation and Plan  Anticipated Discharge Disposition (PT): skilled nursing facility  Progress Summary (PT)  Progress Toward Functional Goals (PT): unable to show any progress toward functional goals  Plan of Care Reviewed With: patient  Outcome Evaluation: pt. does not tolerate any LE ROM or mobilization d/t pain.  Plan to d/c from skilled inpt services at this time.  Please reconsult if pt. becomes appropriate.         Time Calculation:    PT Charges     Row Name 02/01/23 1284             Time Calculation    PT  Received On 02/01/23  -TUCKER            User Key  (r) = Recorded By, (t) = Taken By, (c) = Cosigned By    Initials Name Provider Type    Natalia Olivera, PT Physical Therapist              Therapy Charges for Today     Code Description Service Date Service Provider Modifiers Qty    67630272901 HC PT THER PROC EA 15 MIN 2/1/2023 Natalia Torres, PT GP 1          PT G-Codes  AM-PAC 6 Clicks Score (PT): 6    PT Discharge Summary  Anticipated Discharge Disposition (PT): skilled nursing facility    Natalia Torres, PT  2/1/2023

## 2023-02-01 NOTE — DISCHARGE PLACEMENT REQUEST
"Vani Mojica (82 y.o. Female)     Date of Birth   1940    Social Security Number       Address   [address] [address]    Home Phone   [phone]    MRN   [id number]       Rastafarian   None    Marital Status   Single                            Admission Date   1/29/23    Admission Type   Emergency    Admitting Provider   Nati Melara DO    Attending Provider   Gabrielle Pinon MD    Department, Room/Bed   [address], 3339/1P       Discharge Date       Discharge Disposition       Discharge Destination                               Attending Provider: Gabrielle Pinon MD    Allergies: No Known Allergies    Isolation: Enh Drop/Con   Infection: COVID (confirmed) (01/29/23)   Code Status: No CPR    Ht: 175.3 cm (69\")   Wt: 76.4 kg (168 lb 6.4 oz)    Admission Cmt: None   Principal Problem: UTI (urinary tract infection) [N39.0]                 Active Insurance as of 1/29/2023     Primary Coverage     Payor Plan Insurance Group Employer/Plan Group    MEDICARE MEDICARE A & B      Payor Plan Address Payor Plan Phone Number Payor Plan Fax Number Effective Dates    [address] [phone]  3/1/2000 - None Entered    [place] [id number]       Subscriber Name Subscriber Birth Date Member ID       VANI MOJICA 1940 [id number]                 Emergency Contacts      (Rel.) Home Phone Work Phone Mobile Phone    Dejuan Mojica -- -- [phone]    JESUS TAYLOR (Daughter) [phone] -- [phone]            Insurance Information                MEDICARE/MEDICARE A & B Phone: [phone]    Subscriber: Vani Mojica Subscriber#: [id number]    Group#: -- Precert#: --             History & Physical      Stephanie Gerard PA-C at 01/29/23 0356     Attestation signed by Nati Melara DO at 01/29/23 6988    I have reviewed this documentation and agree.                       [facility] Medicine " Services  HISTORY & PHYSICAL    Patient Identification:  Name:  Vani Gee  Age:  82 y.o.  Sex:  female  :  1940  MRN:  [id number]   Visit Number:  [id number]  Admit Date: 2023   Primary Care Physician:  Antwan Schaefer MD     Subjective     Chief complaint:   Chief Complaint   Patient presents with   • Fall     History of presenting illness:   Patient is a 82 y.o. female with past medical history significant for essential hypertension, hyperlipidemia, gout, arthritis, dementia, that presented to the [facility] emergency department for evaluation of fall at home.    Upon evaluation at bedside the patient was asleep but easily aroused when spoken to.  She is alert and oriented to herself and birthday but is disoriented to the month, year and location.  She has no acute complaints at this time.  Due to her dementia she is unable to provide any reliable history of presenting illness.  No family is present at bedside, as a result, history obtained from emergency department note.  Per ED note the patient was brought in by EMS after a fall at home.  Her daughter reported she was last seen yesterday and found down on the ground today.  The patient currently lives alone but lives next door to family.  RN present at bedside states when she spoke to the daughter they stated the patient will often take 5 days worth of her medication at one time but will then go for extended periods without taking anything.    Upon arrival to the ED, vitals were temperature 99.1 °F, pulse 101, respiration 16, /100, SPO2 95% on room air.    In the emergency department the patient received IV Rocephin 2 g, IV labetalol 20 mg, IV labetalol 40 mg, IV  mL bolus    Patient has been admitted to the telemetry floor for further evaluation and treatment.  Patient seen and evaluated in the emergency department room  108.  ---------------------------------------------------------------------------------------------------------------------   Review of Systems   Unable to perform ROS: Dementia      ---------------------------------------------------------------------------------------------------------------------   No past medical history on file.  No past surgical history on file.  No family history on file.  Social History     Socioeconomic History   • Marital status: Single     ---------------------------------------------------------------------------------------------------------------------   Allergies:  Patient has no known allergies.  ---------------------------------------------------------------------------------------------------------------------   Medications below are reported home medications pulling from within the system; at this time, these medications have not been reconciled unless otherwise specified and are in the verification process for further verifcation as current home medications.    Prior to Admission Medications     None        ---------------------------------------------------------------------------------------------------------------------    Objective     Hospital Scheduled Meds:  neomycin-polymyxin-hydrocortisone, 4 drop, Right Ear, Q6H           Current listed hospital scheduled medications may not yet reflect those currently placed in orders that are signed and held, awaiting patient's arrival to floor/unit.    ---------------------------------------------------------------------------------------------------------------------   Vital Signs:  Temp:  [99.1 °F (37.3 °C)] 99.1 °F (37.3 °C)  Heart Rate:  [] 82  Resp:  [16] 16  BP: (143-203)/() 188/85  Mean Arterial Pressure (Non-Invasive) for the past 24 hrs (Last 3 readings):   Noninvasive MAP (mmHg)   01/29/23 0500 112   01/29/23 0430 119   01/29/23 0230 119     SpO2 Percentage    01/29/23 0230 01/29/23 0430 01/29/23 0459   SpO2:  93% 93% 95%     SpO2:  [93 %-96 %] 95 %  on   ;   Device (Oxygen Therapy): room air    Body mass index is 29.53 kg/m².  Wt Readings from Last 3 Encounters:   01/29/23 90.7 kg (200 lb)     ---------------------------------------------------------------------------------------------------------------------   Physical Exam:  Physical Exam  Constitutional:       General: She is awake.      Appearance: Normal appearance. She is well-developed and overweight.   HENT:      Head: Normocephalic and atraumatic.   Eyes:      General: Lids are normal.      Extraocular Movements: Extraocular movements intact.      Pupils: Pupils are equal, round, and reactive to light.   Cardiovascular:      Rate and Rhythm: Normal rate and regular rhythm.      Pulses: Normal pulses.           Dorsalis pedis pulses are 2+ on the right side and 2+ on the left side.        Posterior tibial pulses are 2+ on the right side and 2+ on the left side.      Heart sounds: Normal heart sounds.     No friction rub. No gallop.   Pulmonary:      Effort: Pulmonary effort is normal.      Breath sounds: Normal breath sounds.   Abdominal:      Palpations: Abdomen is soft.      Tenderness: There is abdominal tenderness in the suprapubic area. There is no guarding.   Musculoskeletal:      Right lower leg: No edema.      Left lower leg: No edema.   Skin:     Findings: No ecchymosis or erythema.   Neurological:      General: No focal deficit present.      Mental Status: She is alert. She is confused.      Cranial Nerves: No dysarthria or facial asymmetry.      Motor: Motor function is intact. No weakness.   Psychiatric:         Speech: Speech normal.         Behavior: Behavior is cooperative.       ---------------------------------------------------------------------------------------------------------------------  EKG:    Pending cardiology read.  Per my evaluation, sinus rhythm with PACs, heart rate 99, QTc 474 MS.    Telemetry:    Normal sinus rhythm, heart rate  91, SPO2 94% on room air    I have personally reviewed the EKG/Telemetry strip  ---------------------------------------------------------------------------------------------------------------------   Results from last 7 days   Lab Units 01/29/23  0124   CK TOTAL U/L 282*           Results from last 7 days   Lab Units 01/29/23  0320 01/29/23  0124   CRP mg/dL  --  14.58*   LACTATE mmol/L 2.3*  --    WBC 10*3/mm3  --  11.20*   HEMOGLOBIN g/dL  --  14.7   HEMATOCRIT %  --  42.7   MCV fL  --  95.1   MCHC g/dL  --  34.4   PLATELETS 10*3/mm3  --  215   INR   --  1.04     Results from last 7 days   Lab Units 01/29/23  0124   SODIUM mmol/L 136   POTASSIUM mmol/L 3.9   CHLORIDE mmol/L 96*   CO2 mmol/L 25.2   BUN mg/dL 16   CREATININE mg/dL 0.85   CALCIUM mg/dL 9.1   GLUCOSE mg/dL 214*   ALBUMIN g/dL 3.3*   BILIRUBIN mg/dL 0.7   ALK PHOS U/L 75   AST (SGOT) U/L 18   ALT (SGPT) U/L 6   Estimated Creatinine Clearance: 62.3 mL/min (by C-G formula based on SCr of 0.85 mg/dL).    Pain Management Panel    There is no flowsheet data to display.       I have personally reviewed the above laboratory results.   ---------------------------------------------------------------------------------------------------------------------  Imaging Results (Last 7 Days)     Procedure Component Value Units Date/Time    XR Chest 1 View [[id number]] Collected: 01/29/23 0220     Updated: 01/29/23 0223    Narrative:      CR Chest 1 Vw    INDICATION:   Altered mental status. Fall.     COMPARISON:    None available.    FINDINGS:  Portable AP view(s) of the chest.  The heart and mediastinal contours are normal. Low lung volumes. The lungs are clear. No pneumothorax or pleural effusion.      Impression:      No acute cardiopulmonary findings.    Signer Name: John Olsen MD   Signed: 1/29/2023 2:20 AM   Workstation Name: [email or web address]    [facility]    CT Head Without Contrast [[id number]] Collected: 01/29/23 0220     Updated:  01/29/23 0222    Narrative:      CT Head WO    HISTORY:   Head pain status post fall.    TECHNIQUE:   Routine noncontrast head CT. Coronal and sagittal reformatted images. Radiation dose reduction techniques included automated exposure control or exposure modulation based on body size. Count of known CT and cardiac nuc med studies performed in previous  12 months: 0.     COMPARISON:   None available.    FINDINGS:   No hemorrhage, acute infarction, mass lesion, or abnormal extra-axial fluid collection. No midline shift or focal mass effect. Ventricular system is normal in size and configuration. Small focus of chronic asymmetric left basal ganglia calcification.  Mild generalized atrophy and chronic small vessel disease throughout the supratentorial white matter. No acute osseous abnormality. Complete opacification of the right sphenoid sinus. Visualized mastoid air cells are clear.      Impression:      No acute intracranial abnormality. Complete opacification of the right sphenoid sinus.          Signer Name: John Olsen MD   Signed: 1/29/2023 2:20 AM   Workstation Name: [email or web address]    [facility]        I have personally reviewed the above radiology results.     ---------------------------------------------------------------------------------------------------------------------    Assessment & Plan      Active Hospital Problems    Diagnosis  POA   • **UTI (urinary tract infection) [N39.0]  Yes     #Severe sepsis criteria POA secondary to acute UTI (HR> 90, CRP> 2, lactate> 2)  #Microscopic hematuria  -UA grossly abnormal  -Received IV Rocephin in the ED, continue with IV Rocephin on the floor  -Blood cultures and urine culture pending  -Repeat a.m. CBC and CRP; continue to trend lactate until normalized  -Monitor closely     #Hypertensive urgency  #Known hx of essential hypertension   -Highest documented BP in the emergency department 203/104; BP has improved slightly to 188/85 after  receiving a total of IV labetalol 40 mg   -Review home medications once reconciled per pharmacy; plan to continue antihypertensive agents with appropriate holding parameters  -Goal BP around 160 systolic for the next 24 hours  -IV Lopressor 5 mg every 6 hours as needed for SBP >160 and DBP >100; hold for HR <60  -Monitor VS per hospital protocol     #Trace ketonuria  #Hyperglycemia POA with no known history of DM  -UA with 1+ ketones  -Obtain blood acetone     #Recent fall  #Dementia   -CT of head without contrast unremarkable   -PT/OT/SLP consulted   -Up with assistance, fall and aspiration precautions in place     #Asymptomatic COVID 19   -COVID-19 positive  -Chest x-ray unremarkable  -Supportive care  -Isolation precautions in place    #Hyperglycemia with no known history of DM   -Obtain hemoglobin A1c    F/E/N: No IV fluids.  Replace electrolytes as necessary.  Regular diet  ---------------------------------------------------  DVT Prophylaxis: Subcutaneous heparin  GI Prophylaxis: Protonix  Activity: Up with assistance, fall precautions    The patient is considered to be a high risk patient due to: Severe sepsis criteria POA secondary to acute UTI, hypertensive urgency, trace ketonuria, asymptomatic COVID-19    INPATIENT status due to the need for care which can only be reasonably provided in an hospital setting such as aggressive/expedited ancillary services and/or consultation services, the necessity for IV medications, close physician monitoring and/or the possible need for procedures.  In such, I feel patient’s risk for adverse outcomes and need for care warrant INPATIENT evaluation and predict the patient’s care encounter to likely last beyond 2 midnights.    Code Status: FULL CODE     Disposition/Discharge planning: Plan for home at discharge.  Pending clinical course    I have discussed the patient's assessment and plan with the patient and attending physician      Stephanie Gerard PA-C  Hospitalist  Service -- [facility]       01/29/23  05:21 EST    Attending Physician: Nati Melara DO       Electronically signed by Nati Melara DO at 01/29/23 0713         Current Facility-Administered Medications   Medication Dose Route Frequency Provider Last Rate Last Admin   • acetaminophen (TYLENOL) tablet 650 mg  650 mg Oral Q6H PRN Stephanie Gerard PA-C   650 mg at 02/01/23 1708   • benzonatate (TESSALON) capsule 100 mg  100 mg Oral TID PRN Stephanie Gerard PA-C       • calcium carbonate (TUMS) chewable tablet 500 mg (200 mg elemental)  2 tablet Oral TID PRN Stephanie Gerard PA-C       • carvedilol (COREG) tablet 25 mg  25 mg Oral Daily Fausto Murphy DO   25 mg at 02/01/23 0838   • cefTRIAXone (ROCEPHIN) 1 g in sodium chloride 0.9 % 100 mL IVPB-VTB  1 g Intravenous Q24H Stephanie Gerard PA-C 200 mL/hr at 02/01/23 0233 1 g at 02/01/23 0233   • cyanocobalamin injection 1,000 mcg  1,000 mcg Intramuscular Daily Gabrielle Pinon MD   1,000 mcg at 02/01/23 0837   • dextrose (D50W) (25 g/50 mL) IV injection 25 g  25 g Intravenous Q15 Min PRN Gabrielle Pinon MD       • dextrose (GLUTOSE) oral gel 15 g  15 g Oral Q15 Min PRN Gabrielle Pinon MD       • Diclofenac Sodium (VOLTAREN) 1 % gel 2 g  2 g Topical BID PRN Fausto Murphy DO       • fluticasone (FLONASE) 50 MCG/ACT nasal spray 2 spray  2 spray Each Nare Daily Stephanie Gerard PA-C   2 spray at 02/01/23 0840   • glucagon (human recombinant) (GLUCAGEN DIAGNOSTIC) injection 1 mg  1 mg Intramuscular Q15 Min PRN Gabrielle Pinon MD       • guaiFENesin-dextromethorphan (ROBITUSSIN DM) 100-10 MG/5ML syrup 5 mL  5 mL Oral Q4H PRN Stephanie Gerard PA-C       • heparin (porcine) 5000 UNIT/ML injection 2,300 Units  30 Units/kg Intravenous PRN Gabrielle Pinon MD   2,300 Units at 02/01/23 1301   • heparin (porcine) 5000 UNIT/ML injection 4,600 Units  60 Units/kg Intravenous PRN Gabrielle Pinon  MD Burak       • heparin 02646 units/250 mL (100 units/mL) in 0.9% NaCl infusion  12 Units/kg/hr Intravenous Titrated Gabrielle Pinon MD 9.16 mL/hr at 02/01/23 1305 12 Units/kg/hr at 02/01/23 1305   • hydrOXYzine (ATARAX) tablet 25 mg  25 mg Oral TID PRN Stephanie Gerard PA-C       • Insulin Aspart (novoLOG) injection 0-7 Units  0-7 Units Subcutaneous TID AC Gabrielle Pinon MD   3 Units at 02/01/23 1708   • labetalol (NORMODYNE,TRANDATE) injection 10 mg  10 mg Intravenous Q6H PRN Fausto Murphy DO   10 mg at 01/29/23 1200   • lisinopril (PRINIVIL,ZESTRIL) tablet 40 mg  40 mg Oral Daily Fausto Murphy DO   40 mg at 02/01/23 0837   • Magnesium Sulfate - Total Dose 10 grams - Magnesium 1 or Less  2 g Intravenous PRN Gabrielle Pinon MD        Or   • Magnesium Sulfate - Total Dose 6 grams - Magnesium 1.1 - 1.5  2 g Intravenous PRN Gabrielle Pinon MD        Or   • Magnesium Sulfate - Total Dose 4 grams - Magnesium 1.6 - 1.9  4 g Intravenous PRN Gabrielle Pinon MD       • melatonin tablet 10 mg  10 mg Oral Nightly PRN Stephanie Gerard PA-C   10 mg at 01/30/23 2149   • multivitamin (THERAGRAN) tablet 1 tablet  1 tablet Oral Daily Stephanie Gerard PA-C   1 tablet at 02/01/23 0838   • neomycin-polymyxin-hydrocortisone (CORTISPORIN) otic suspension 4 drop  4 drop Right Ear Q6H Nati Melara DO   4 drop at 02/01/23 1709   • pantoprazole (PROTONIX) EC tablet 40 mg  40 mg Oral Q AM Stephanie Gerard PA-C   40 mg at 02/01/23 0546   • potassium & sodium phosphates (PHOS-NAK) 280-160-250 MG packet - for Phosphorus less than 1.25 mg/dL  2 packet Oral Q6H PRN Gabrielle Pinon MD        Or   • potassium & sodium phosphates (PHOS-NAK) 280-160-250 MG packet - for Phosphorus 1.25 - 2.5 mg/dL  2 packet Oral Q6H PRN Gabrielle Pinon MD       • potassium chloride (KLOR-CON) packet 40 mEq  40 mEq Oral PRN Gabrielle Pinon MD       • potassium chloride  (MICRO-K) CR capsule 40 mEq  40 mEq Oral PRN Gabrielle Pinon MD       • sodium chloride 0.9 % flush 10 mL  10 mL Intravenous Q12H Nati Melara DO   10 mL at 23 0838   • sodium chloride 0.9 % flush 10 mL  10 mL Intravenous PRN Nati Melara DO       • sodium chloride 0.9 % infusion 40 mL  40 mL Intravenous PRN Nati Melara DO            Physician Progress Notes (most recent note)      Gabrielle Pinon MD at 23 1221              [facility]     Patient Identification:  Name:  Vani Gee  Age:  82 y.o.  Sex:  female  :  1940  MRN:  [id number]  Visit Number:  [id number]  Primary Care Provider:  Antwan Schaefer MD    Length of stay:  3    Subjective: Concerns for deep venous thrombosis/PE was confirmed by CT angio chest patient currently on heparin drip, daughter was updated and agreed to proceed with heparin drip, pros and cons and potential side effects of heparin drip was discussed with patient's daughter.  Patient's gets confused especially at night but not agitated.  Patient yesterday early evening while daughter and son visited they have discussed advanced directive and patient does not want to be intubated, electrocardioversion or CPR.  This decision was supported by family.  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 and has witnessed CPR and ACLS and does not want  sees her venous Doppler both lower extremities negative for DVT PE    Chief Complaint: Fall, PE  ----------------------------------------------------------------------------------------------------------------------  [facility] Meds:  carvedilol, 25 mg, Oral, Daily  cefTRIAXone, 1 g, Intravenous, Q24H  cyanocobalamin, 1,000 mcg, Intramuscular, Daily  fluticasone, 2 spray, Each Nare, Daily  Insulin Aspart, 0-7 Units, Subcutaneous, TID AC  lisinopril, 40 mg, Oral, Daily  multivitamin, 1 tablet, Oral,  Daily  neomycin-polymyxin-hydrocortisone, 4 drop, Right Ear, Q6H  pantoprazole, 40 mg, Oral, Q AM  sodium chloride, 10 mL, Intravenous, Q12H      heparin, 12 Units/kg/hr, Last Rate: 10 Units/kg/hr (02/01/23 0602)      ----------------------------------------------------------------------------------------------------------------------  Vital Signs:  Temp:  [98.4 °F (36.9 °C)-99.6 °F (37.6 °C)] 99.5 °F (37.5 °C)  Heart Rate:  [73-89] 88  Resp:  [17-20] 20  BP: (165-178)/(70-84) 178/82       Tele:       01/29/23  0111 01/29/23  0530   Weight: 90.7 kg (200 lb) 76.4 kg (168 lb 6.4 oz)     Body mass index is 24.87 kg/m².    Intake/Output Summary (Last 24 hours) at 2/1/2023 1221  Last data filed at 2/1/2023 0900  Gross per 24 hour   Intake 669.33 ml   Output 800 ml   Net -130.67 ml     Diet: Diabetic Diets; Consistent Carbohydrate; Texture: Regular Texture (IDDSI 7); Fluid Consistency: Thin (IDDSI 0)  ----------------------------------------------------------------------------------------------------------------------  Physical exam:  General: Comfortable,awake, alert, oriented to self, place, well-developed and well-nourished.  No respiratory distress.    Skin:  Skin is warm and dry. No rash noted. No pallor.    HENT:  Head:  Normocephalic and atraumatic.  Mouth:  Moist mucous membranes.    Eyes:  Conjunctivae and EOM are normal.  Pupils are equal, round, and reactive to light.  No scleral icterus.    Neck:  Neck supple.  No JVD present.    Pulmonary/Chest:  No respiratory distress, no wheezes, no crackles, with normal breath sounds and good air movement.  Cardiovascular:  Normal rate, regular rhythm and normal heart sounds with no murmur.  Abdominal:  Soft.  Bowel sounds are normal.  No distension and no tenderness.   Extremities:  No edema, no tenderness, and no deformity.  No red or swollen joints anywhere.  Strong pulses in all 4 extremities with no clubbing, no cyanosis, no edema.  Neurological:  Motor strength  equal no obvious deficit, sensory grossly intact.   No cranial nerve deficit.  No tongue deviation.  No facial droop.  No slurred speech.    Genitourinary:   Back:  ----------------------------------------------------------------------------------------------------------------------  ----------------------------------------------------------------------------------------------------------------------  Results from last 7 days   Lab Units 01/29/23  0124   CK TOTAL U/L 282*     Results from last 7 days   Lab Units 02/01/23  0450 01/31/23  1447 01/30/23  1613 01/30/23  0343 01/29/23  0950 01/29/23  0649 01/29/23  0320 01/29/23  0124   CRP mg/dL  --   --  30.35*  --   --  16.58*  --  14.58*   LACTATE mmol/L  --   --   --   --  1.4 2.1* 2.3*  --    WBC 10*3/mm3 8.30 7.82  --  8.63  --  10.03  --  11.20*   HEMOGLOBIN g/dL 12.3 12.5  --  11.7*  --  13.9  --  14.7   HEMATOCRIT % 37.8 38.7  --  35.3  --  42.0  --  42.7   MCV fL 95.5 97.0  --  97.2*  --  97.0  --  95.1   MCHC g/dL 32.5 32.3  --  33.1  --  33.1  --  34.4   PLATELETS 10*3/mm3 199 161  --  150  --  195  --  215   INR   --  1.05  --   --   --   --   --  1.04         Results from last 7 days   Lab Units 01/30/23  0343 01/29/23  0649 01/29/23  0124   SODIUM mmol/L 133* 135* 136   POTASSIUM mmol/L 3.4* 3.6 3.9   MAGNESIUM mg/dL  --  1.6  --    CHLORIDE mmol/L 95* 95* 96*   CO2 mmol/L 28.0 28.7 25.2   BUN mg/dL 24* 15 16   CREATININE mg/dL 0.91 0.85 0.85   CALCIUM mg/dL 8.2* 8.8 9.1   GLUCOSE mg/dL 172* 224* 214*   ALBUMIN g/dL  --  2.8* 3.3*   BILIRUBIN mg/dL  --  0.4 0.7   ALK PHOS U/L  --  63 75   AST (SGOT) U/L  --  15 18   ALT (SGPT) U/L  --  <5 6   Estimated Creatinine Clearance: 58.5 mL/min (by C-G formula based on SCr of 0.91 mg/dL).    No results found for: AMMONIA      Blood Culture   Date Value Ref Range Status   01/29/2023 No growth at 3 days  Preliminary   01/29/2023 No growth at 3 days  Preliminary     Urine Culture   Date Value Ref Range Status    01/29/2023 >100,000 CFU/mL Escherichia coli (A)  Final     No results found for: WOUNDCX  No results found for: STOOLCX    I have personally looked at the labs and they are summarized above.  ----------------------------------------------------------------------------------------------------------------------  Imaging Results (Last 24 Hours)     Procedure Component Value Units Date/Time    US Venous Doppler Lower Extremity Bilateral (duplex) [[id number]] Collected: 01/31/23 1431     Updated: 01/31/23 1455    Narrative:      US VENOUS DOPPLER LOWER EXTREMITY BILATERAL (DUPLEX)-     CLINICAL INDICATION: D-dimer elevation COVID-19; H60.501-Unspecified  acute noninfective otitis externa, right ear; N30.01-Acute cystitis with  hematuria; G93.41-Metabolic encephalopathy; U07.1-COVID-19        COMPARISON: None available      TECHNIQUE: Color Doppler imaging was used with compression and  augmentation to evaluate the lower extremity deep venous system.     FINDINGS:   There is patent spontaneous flow from the common femoral vein through  the posterior tibial veins.  There was no internal clot or area of noncompressibility.  Normal augmentation was elicited where applicable.       Impression:         1. No evidence of deep vein thrombus  2. Baker's cyst in the left popliteal fossa     This report was finalized on 1/31/2023 2:31 PM by Dr. Brad Ye MD.       CT Angiogram Chest Pulmonary Embolism [[id number]] Collected: 01/31/23 1305     Updated: 01/31/23 1310    Narrative:      EXAM:    CT Angiography Chest With Intravenous Contrast     EXAM DATE:    1/31/2023 12:26 PM     CLINICAL HISTORY:    D-dimer elevation COVID-19; H60.501-Unspecified acute noninfective  otitis externa, right ear; N30.01-Acute cystitis with hematuria;  G93.41-Metabolic encephalopathy; U07.1-COVID-19     TECHNIQUE:    Axial computed tomographic angiography images of the chest with  intravenous contrast.  This CT exam was performed using one or more  of  the following dose reduction techniques:  automated exposure control,  adjustment of the mA and/or kV according to patient size, and/or use of  iterative reconstruction technique.    MIP reconstructed images were created and reviewed.     COMPARISON:    No relevant prior studies available.     FINDINGS:    Pulmonary arteries:  Filling defects of the right pulmonary arteries  consistent with multiple pulmonary emboli. Involvement is within the  lobar and segmental branches.    Aorta:  No acute findings.  No thoracic aortic aneurysm.    Lungs:  No pulmonary parenchymal infarct identified.  Interstitial  thickening compatible with edema.  Dependent basilar atelectasis.  No  mass.    Pleural space:  Trace pleural effusions.  No pneumothorax.    Heart:  No CT evidence of right heart strain.  Cardiomegaly with  severe coronary artery calcifications.  No pericardial effusion.  Mitral  valvular calcifications.    Bones/joints:  Degenerative changes thoracic spine.  Compression type  fracture of the T11 vertebral body without bony retropulsion and only  mild height loss noted.  No dislocation.    Soft tissues:  See above.    Lymph nodes:  Unremarkable.  No enlarged lymph nodes.    Adrenals:  Right adrenal nodule or hypertrophy is noted and is low  attenuation favoring benign etiology.       Impression:      1.  Pulmonary emboli of the right lung without evidence of parenchymal  infarct or right heart strain.  2.  CHF/edema with trace effusions.  3.  Age indeterminate but likely chronic T11 compression fracture  without bony retropulsion.  4.  Cardiomegaly with severe coronary calcifications. Other nonacute  findings above.     This report was finalized on 1/31/2023 1:08 PM by Dr. Bird oDnis MD.           ----------------------------------------------------------------------------------------------------------------------  Assessment and Plan:  -Acute pulmonary embolism right lung without evidence of parenchymal  infarct or right heart strain  -COVID-19 without evidence of pneumonia  -B12 deficiency  -Dementia without behavioral disturbances  -Diabetes type 2 newly diagnosed  -Acute cystitis without hematuria  -Acute hypokalemia    So far patient has tolerated heparin drip, will give her dose of Lasix today.  Advanced directive has been ordered as requested by patient yesterday evening when she was not confused this was done in the presence of patient's daughter and son who supported the patient's decision.  Fall precautions, PT OT, replace electrolytes    Disposition Home pending improvement      Gabrielle Pinon MD  23  12:21 EST    Electronically signed by Gabrielle Pinon MD at 23 1231          Physical Therapy Notes (most recent note)      Natalia Torres, PT at 23 1302  Version 1 of 1         Acute Care - Physical Therapy Treatment Note/Discharge  MIRACLE Tapia     Patient Name: Vani Gee  : 1940  MRN: [id number]  Today's Date: 2023   Onset of Illness/Injury or Date of Surgery: 23     Referring Physician: Saritha      Admit Date: 2023    Visit Dx:    ICD-10-CM ICD-9-CM   1. Acute otitis externa of right ear, unspecified type  H60.501 380.10   2. Acute cystitis with hematuria  N30.01 595.0   3. Acute metabolic encephalopathy  G93.41 348.31   4. COVID-19  U07.1 079.89     Patient Active Problem List   Diagnosis   • UTI (urinary tract infection)     Past Medical History:   Diagnosis Date   • Arthritis    • Dementia (HCC)    • Essential hypertension    • Gout      History reviewed. No pertinent surgical history.    PT Assessment (last 12 hours)     PT Evaluation and Treatment     Row Name 23 1255          Physical Therapy Time and Intention    Subjective Information complains of;pain  -AG     Document Type therapy note (daily note)  -AG     Mode of Treatment physical therapy  -AG     Patient Effort poor  -AG     Symptoms Noted During/After Treatment increased pain   -     Row Name 02/01/23 1255          General Information    Patient Profile Reviewed yes  -     Patient Observations poorly cooperative  -     Patient/Family/Caregiver Comments/Observations pt. supine, drowsy.  Awakens quickly with LE PROM  Pt. poorly tolerates light touch of B LE and cries out in pain.  -     Barriers to Rehab medically complex;physical barrier  -     Row Name 02/01/23 1255          Pain    Additional Documentation Pain Scale: FACES Pre/Post-Treatment (Group)  -[facility] Name 02/01/23 1255          Pain Scale: FACES Pre/Post-Treatment    Pain: FACES Scale, Pretreatment 0-->no hurt  -AG     Posttreatment Pain Rating 10-->hurts worst  -AG     Pre/Posttreatment Pain Comment B LE pain with light touch of lower legs as well as with mobilization.  Pt. cries out in pain with minimal contact.  -[facility] Name 02/01/23 1255          Cognition    Behavioral Issues (Cognition) overwhelmed easily  -[facility] Name 02/01/23 1255          Range of Motion (ROM)    Range of Motion --  B LE PROM tolerated poorly  -[facility] Name 02/01/23 1255          Bed Mobility    Rolling Left [place] (Bed Mobility) dependent (less than 25% patient effort)  -     Rolling Right [place] (Bed Mobility) dependent (less than 25% patient effort)  -     Row Name 02/01/23 1255          Motor Skills    Therapeutic Exercise knee;hip;ankle  -[facility] Name 02/01/23 1255          Hip (Therapeutic Exercise)    Hip PROM (Therapeutic Exercise) bilateral;flexion;extension;aBduction;aDduction  -[facility] Name 02/01/23 1255          Knee (Therapeutic Exercise)    Knee PROM (Therapeutic Exercise) bilateral;flexion;extension;supine  -     Row Name 02/01/23 1255          Ankle (Therapeutic Exercise)    Ankle PROM (Therapeutic Exercise) bilateral;dorsiflexion;supine  -[facility] Name             Wound 01/30/23 1552 Bilateral medial gluteal MASD (Moisture associated skin damage)    Wound - Properties Group Placement  Date: 01/30/23 -TW Placement Time: 1559 -TW Present on Hospital Admission: Y  -TW Side: Bilateral  -TW Orientation: medial  -TW Location: gluteal  -TW Primary Wound Type: MASD  -TW    Retired Wound - Properties Group Placement Date: 01/30/23 -TW Placement Time: 1559 -TW Present on Hospital Admission: Y  -TW Side: Bilateral  -TW Orientation: medial  -TW Location: gluteal  -TW Primary Wound Type: MASD  -TW    Retired Wound - Properties Group Date first assessed: 01/30/23 -TW Time first assessed: 1559 -TW Present on Hospital Admission: Y  -TW Side: Bilateral  -TW Location: gluteal  -TW Primary Wound Type: MASD  -TW    Row Name 02/01/23 1255          Coping    Observed Emotional State tearful/crying  -AG     Verbalized Emotional State acceptance  -AG     Trust Relationship/Rapport care explained;choices provided;thoughts/feelings acknowledged  -AG     Family/Support Persons family  -AG     Involvement in Care not present at bedside  -AG     Family/Support System Care self-care encouraged  -AG     Row Name 02/01/23 1255          Plan of Care Review    Plan of Care Reviewed With patient  -AG     Outcome Evaluation pt. does not tolerate any LE ROM or mobilization d/t pain.  Plan to d/c from skilled inpt services at this time.  Please reconsult if pt. becomes appropriate.  -AG     Row Name 02/01/23 1255          Positioning and Restraints    Pre-Treatment Position in bed  -AG     Post Treatment Position bed  -AG     In Bed supine;call light within reach;encouraged to call for assist;with nsg;side rails up x3;heels elevated  -AG     Row Name 02/01/23 1255          Progress Summary (PT)    Progress Toward Functional Goals (PT) unable to show any progress toward functional goals  -AG     Row Name 02/01/23 1255          Therapy Plan Review/Discharge Plan (PT)    Therapy Plan Review (PT) evaluation/treatment results reviewed;care plan/treatment goals reviewed;risks/benefits reviewed;current/potential barriers  reviewed;participants voiced agreement with care plan;participants included;patient  -AG           User Key  (r) = Recorded By, (t) = Taken By, (c) = Cosigned By    Initials Name Provider Type    Elva Durand, RN Registered Nurse    Natalia Olivera, PT Physical Therapist                      PT Recommendation and Plan  Anticipated Discharge Disposition (PT): [facility] nursing facility  Progress Summary (PT)  Progress Toward Functional Goals (PT): unable to show any progress toward functional goals  Plan of Care Reviewed With: patient  Outcome Evaluation: pt. does not tolerate any LE ROM or mobilization d/t pain.  Plan to d/c from skilled inpt services at this time.  Please reconsult if pt. becomes appropriate.         Time Calculation:    PT Charges     Row Name 23 1301             Time Calculation    PT Received On 23  -            User Key  (r) = Recorded By, (t) = Taken By, (c) = Cosigned By    Initials Name Provider Type    Natalia Olivera, PT Physical Therapist              Therapy Charges for Today     Code Description Service Date Service Provider Modifiers Qty    90588497669 HC PT THER PROC EA 15 MIN 2023 Natalia Torres, PT GP 1          PT G-Codes  AM-PAC 6 Clicks Score (PT): 6    PT Discharge Summary  Anticipated Discharge Disposition (PT): skilled nursing facility    Natalia Torers PT  2023         Electronically signed by Natalia Torres, PT at 23 1302          Occupational Therapy Notes (most recent note)      Bird Tam, OT at 23 1343          Acute Care - Occupational Therapy Initial Evaluation  MIRACLE Willie     Patient Name: Vani Gee  : 1940  MRN: [id number]  Today's Date: 2023  Onset of Illness/Injury or Date of Surgery: 23     Referring Physician: Saritha    Admit Date: 2023       ICD-10-CM ICD-9-CM   1. Acute otitis externa of right ear, unspecified type  H60.501 380.10   2. Acute cystitis with hematuria  N30.01 595.0   3.  Acute metabolic encephalopathy  G93.41 348.31   4. COVID-19  U07.1 079.89     Patient Active Problem List   Diagnosis   • UTI (urinary tract infection)     Past Medical History:   Diagnosis Date   • Arthritis    • Dementia (HCC)    • Essential hypertension    • Gout      History reviewed. No pertinent surgical history.      OT ASSESSMENT FLOWSHEET (last 12 hours)     OT Evaluation and Treatment     Row Name 01/30/23 1328                   OT Time and Intention    Document Type evaluation  -KR        Mode of Treatment occupational therapy  -KR        Patient Effort good  -KR           General Information    General Observations of Patient alert/cooperative, apprehensive  -KR           Living Environment    Current Living Arrangements home  -KR        People in Home alone  per pt report  -KR           Cognition    Affect/Mental Status (Cognition) confused;flat/blunted affect  -KR        Orientation Status (Cognition) oriented to;person  -KR        Follows Commands (Cognition) increased processing time needed;verbal cues/prompting required  -KR           Range of Motion Comprehensive    Comment, General Range of Motion BUE AROM 2/5  -KR           Strength Comprehensive (MMT)    Comment, General Manual Muscle Testing (MMT) Assessment 2/5  -KR           Activities of Daily Living    BADL Assessment/Intervention bathing;upper body dressing;lower body dressing;grooming;feeding;toileting  -KR           Bathing Assessment/Intervention    [place] Level (Bathing) bathing skills;maximum assist (25% patient effort);dependent (less than 25% patient effort)  -KR           Upper Body Dressing Assessment/Training    [place] Level (Upper Body Dressing) upper body dressing skills;maximum assist (25% patient effort);dependent (less than 25% patient effort)  -KR           Lower Body Dressing Assessment/Training    [place] Level (Lower Body Dressing) dependent (less than 25% patient effort)  -KR           Grooming  Assessment/Training    [place] Level (Grooming) grooming skills;maximum assist (25% patient effort)  -KR           Self-Feeding Assessment/Training    [place] Level (Feeding) feeding skills;moderate assist (50% patient effort);maximum assist (25% patient effort)  -KR           Toileting Assessment/Training    [place] Level (Toileting) toileting skills;dependent (less than 25% patient effort)  -KR           Wound 01/29/23 0642 medial coccyx Pressure Injury    Wound - Properties Group Placement Date: 01/29/23  -KF Placement Time: 0642  -KF Present on Hospital Admission: Y  -KF Orientation: medial  -KF Location: coccyx  -KF Primary Wound Type: Pressure inj  -KF    Retired Wound - Properties Group Placement Date: 01/29/23  -KF Placement Time: 0642  -KF Present on Hospital Admission: Y  -KF Orientation: medial  -KF Location: coccyx  -KF Primary Wound Type: Pressure inj  -KF    Retired Wound - Properties Group Date first assessed: 01/29/23  -KF Time first assessed: 0642  -KF Present on Hospital Admission: Y  -KF Location: coccyx  -KF Primary Wound Type: Pressure inj  -KF       Wound 01/29/23 0643 Left proximal gluteal Pressure Injury    Wound - Properties Group Placement Date: 01/29/23  -KF Placement Time: 0643  -KF Present on Hospital Admission: Y  -KF Side: Left  -KF Orientation: proximal  -KF Location: gluteal  -KF Primary Wound Type: Pressure inj  -KF    Retired Wound - Properties Group Placement Date: 01/29/23  -KF Placement Time: 0643  -KF Present on Hospital Admission: Y  -KF Side: Left  -KF Orientation: proximal  -KF Location: gluteal  -KF Primary Wound Type: Pressure inj  -KF    Retired Wound - Properties Group Date first assessed: 01/29/23  -KF Time first assessed: 0643  -KF Present on Hospital Admission: Y  -KF Side: Left  -KF Location: gluteal  -KF Primary Wound Type: Pressure inj  -KF       Wound 01/29/23 0643 Right proximal gluteal Pressure Injury    Wound - Properties Group Placement Date:  01/29/23  -KF Placement Time: 0643  -KF Present on Hospital Admission: Y  -KF Side: Right  -KF Orientation: proximal  -KF Location: gluteal  -KF Primary Wound Type: Pressure inj  -KF    Retired Wound - Properties Group Placement Date: 01/29/23 -KF Placement Time: 0643  -KF Present on Hospital Admission: Y  -KF Side: Right  -KF Orientation: proximal  -KF Location: gluteal  -KF Primary Wound Type: Pressure inj  -KF    Retired Wound - Properties Group Date first assessed: 01/29/23 -KF Time first assessed: 0643  -KF Present on Hospital Admission: Y  -KF Side: Right  -KF Location: gluteal  -KF Primary Wound Type: Pressure inj  -KF       Wound 01/29/23 0644 Right gluteal Pressure Injury    Wound - Properties Group Placement Date: 01/29/23 -KF Placement Time: 0644  -KF Side: Right  -KF Location: gluteal  -KF Primary Wound Type: Pressure inj  -KF    Retired Wound - Properties Group Placement Date: 01/29/23 -KF Placement Time: 0644  -KF Side: Right  -KF Location: gluteal  -KF Primary Wound Type: Pressure inj  -KF    Retired Wound - Properties Group Date first assessed: 01/29/23 -KF Time first assessed: 0644  -KF Side: Right  -KF Location: gluteal  -KF Primary Wound Type: Pressure inj  -KF       Plan of Care Review    Plan of Care Reviewed With patient  -KR           Therapy Assessment/Plan (OT)    Planned Therapy Interventions (OT) activity tolerance training;BADL retraining;ROM/therapeutic exercise;strengthening exercise  -KR           Therapy Plan Review/Discharge Plan (OT)    Anticipated Discharge Disposition (OT) inpatient rehabilitation facility;extended care facility  -KR           OT Goals    Dressing Goal Selection (OT) dressing, OT goal 1  -KR        ROM Goal Selection (OT) ROM, OT goal 1  -KR        Activity Tolerance Goal Selection (OT) activity tolerance, OT goal 1  -KR           Dressing Goal 1 (OT)    Activity/Device (Dressing Goal 1, OT) dressing skills, all  -KR        [place]/Cues Needed (Dressing  Goal 1, OT) moderate assist (50-74% patient effort)  -KR        Time Frame (Dressing Goal 1, OT) by discharge  -KR           ROM Goal 1 (OT)    ROM Goal 1 (OT) BUE AROM increase x 1 to enhance self care skills  -KR        Time Frame (ROM Goal 1, OT) by discharge  -KR            Activity Tolerance Goal 1 (OT)    Activity Tolerance Goal 1 (OT) Increase to enhance performance of self care activity  -KR        Activity Level (Endurance Goal 1, OT) 15 min activity  -KR        Time Frame (Activity Tolerance Goal 1, OT) by discharge  -KR              User Key  (r) = Recorded By, (t) = Taken By, (c) = Cosigned By    Initials Name Effective Dates    Bird Valentino OT 21 -     Kelsi Ríos RN 20 -                        OT Recommendation and Plan  Planned Therapy Interventions (OT): activity tolerance training, BADL retraining, ROM/therapeutic exercise, strengthening exercise  Plan of Care Review  Plan of Care Reviewed With: patient  Plan of Care Reviewed With: patient        Time Calculation:     Therapy Charges for Today     Code Description Service Date Service Provider Modifiers Qty    [id number]  OT EVAL HIGH COMPLEXITY 4 2023 Bird Tam OT GO 1               Bird Tam OT  2023    Electronically signed by Bird aTm OT at 23 1343          Speech Language Pathology Notes (most recent note)      Aspen Grewal MS CCC-SLP at 23 1604          Acute Care - Speech Language Pathology   Swallow Initial Evaluation  [place]ie   CLINICAL DYSPHAGIA EVALUATION     Patient Name: Vani Gee  : 1940  MRN: [id number]  Today's Date: 2023  Onset of Illness/Injury or Date of Surgery: 23     Referring Physician: Saritha      Admit Date: 2023    Vani Gee  is seen at bedside this date on 3N-[id number] to assess safety/efficacy of swallowing fnx, determine safest/least restrictive diet tolerance. She is currently in isolation for Covid 19.     Ms Gee has a  medical hx significant for essential hypertension, hyperlipidemia, gout, arthritis, and dementia. She presented to [facility] [place] ED for evaluation after her family found her on the ground at home where she had apparently fallen.     Upon SLP entry, pt is reclined in bed and answers to SLP verbal greeting. She is agreeable to reposition to upright and centered in bed for acceptance of po trials. She reports no difficulty swallowing of which she is aware and reports no hx of recurrent pneumonia. She is oriented to self and location and discusses close family members and her hometown w/o complications.     She is currently tolerating a po diet of regular textures and thin liquids.     Social History     Socioeconomic History   • Marital status: Single   Tobacco Use   • Smoking status: Never   • Smokeless tobacco: Never   Vaping Use   • Vaping Use: Never used   Substance and Sexual Activity   • Alcohol use: Never   • Drug use: Never   • Sexual activity: Defer      XR Chest 1 View [[id number]] Goldy as Reviewed   Order Status: Completed Collected: 01/29/23 0220    Updated: 01/29/23 0223   Narrative:     CR Chest 1 Vw     INDICATION:   Altered mental status. Fall.     COMPARISON:     None available.     FINDINGS:   Portable AP view(s) of the chest.  The heart and mediastinal contours are normal. Low lung volumes. The lungs are clear. No pneumothorax or pleural effusion.    Impression:     No acute cardiopulmonary findings.     Signer Name: John Olsen MD    Signed: 1/29/2023 2:20 AM    Workstation Name: [email or web address]    [facility]    CT Head Without Contrast [[id number]] Goldy as Reviewed   Order Status: Completed Collected: 01/29/23 0220    Updated: 01/29/23 0222   Narrative:     CT Head WO     HISTORY:   Head pain status post fall.     TECHNIQUE:   Routine noncontrast head CT. Coronal and sagittal reformatted images. Radiation dose reduction techniques included automated exposure control or exposure  modulation based on body size. Count of known CT and cardiac nuc med studies performed in previous   12 months: 0.     COMPARISON:   None available.     FINDINGS:   No hemorrhage, acute infarction, mass lesion, or abnormal extra-axial fluid collection. No midline shift or focal mass effect. Ventricular system is normal in size and configuration. Small focus of chronic asymmetric left basal ganglia calcification.   Mild generalized atrophy and chronic small vessel disease throughout the supratentorial white matter. No acute osseous abnormality. Complete opacification of the right sphenoid sinus. Visualized mastoid air cells are clear.    Impression:     No acute intracranial abnormality. Complete opacification of the right sphenoid sinus.     Signer Name: John Olsen MD    Signed: 1/29/2023 2:20 AM    Workstation Name: [id number]    [facility] [place]       Diet Orders (active) (From admission, onward)     Start     Ordered    01/29/23 0536  Diet: Regular/House Diet; Texture: Regular Texture (IDDSI 7); Fluid Consistency: Thin (IDDSI 0)  Diet Effective Now         01/29/23 0535              She is observed on room air w/o complications.     She is positioned upright and centered in bed to accept multiple po presentations of ice chips solid cracker, puree and thin liquids via spoon, cup and straw.  She is able to self feed.     Facial/oral structures are symmetrical upon observation. Lingual protrusion reveals no deviation. Oral mucosa are moist, pink, and clean. Secretions are clear, thin, and well controlled. OROM/SHAILESH is wfl to imitate oral postures. Gag is not assessed. Volitional cough is intact w/ adequate intensity, clear in quality, non-productive. Voice is adequate in intensity, clear in quality w/ intelligible speech.    Upon po presentations, adequate bolus anticipation and acceptance w/ good labial seal for bolus clearance via spoon bowl, cup rim stability and suction via straw. Bolus  formation, manipulation and control are slightly prolonged w/ mixed phasic-rotary mastication pattern. A-p transit is timely w/o significant oral residue observed.     Pharyngeal swallow is timely w/ adequate hyolaryngeal elevation per palpation. No overt s/s aspiration evidenced across this evaluation. No silent aspiration suspected as pt is w/o changes in vocal quality, respirations or secretions post po presentations. Pt denies odynophagia.    Visit Dx:     ICD-10-CM ICD-9-CM   1. Acute otitis externa of right ear, unspecified type  H60.501 380.10   2. Acute cystitis with hematuria  N30.01 595.0   3. Acute metabolic encephalopathy  G93.41 348.31   4. COVID-19  U07.1 079.89     Patient Active Problem List   Diagnosis   • UTI (urinary tract infection)     Past Medical History:   Diagnosis Date   • Arthritis    • Dementia (HCC)    • Essential hypertension    • Gout      History reviewed. No pertinent surgical history.    Impression:   She presents w/ wfl oropharyngeal swallow w/o s/s aspiration. No s/s indicative of silent aspiration.  No odynophagia reported.  She is felt to most benefit from a continued least restrictive po diet.       SLP Recommendation and Plan       Recommendations:  1. Regular solids, thin liquids.    2. Meds whole in puree/thins.   3. Upright and centered for all po intake  4. IDA precautions.  5. Oral care protocol.    No further formal SLP f/u warranted at this time.    D/w pt results and recommendations w/ verbal agreement.    D/w RN results and recommendations w/ verbal agreement.    Thank you for allowing me to participate in the care of your patient-  Aspen Grewal MS CCC-SLP        EDUCATION  The patient has been educated in the following areas:   Dysphagia (Swallowing Impairment) Oral Care/Hydration Modified Diet Instruction.              Time Calculation:       Aspen Grewal MS CCC-SLP  1/30/2023    Electronically signed by Aspen Grewal MS CCC-SLP at 01/30/23 9288       ADL Documentation  (most recent)    Flowsheet Row Most Recent Value   Transferring 3 - assistive equipment and person   Toileting 2 - assistive person   Bathing 3 - assistive equipment and person   Dressing 2 - assistive person   Eating 2 - assistive person   Communication 0 - understands/communicates without difficulty   Swallowing 0 - swallows foods/liquids without difficulty   Equipment Currently Used at Home cane, straight, cane, quad, commode, walker, standard  [unknown provider]        Discharge Summary    No notes of this type exist for this encounter.       Discharge Order (From admission, onward)    None

## 2023-02-01 NOTE — PROGRESS NOTES
[facility] HOSPITALIST PROGRESS NOTE     Patient Identification:  Name:  Vani Gee  Age:  82 y.o.  Sex:  female  :  1940  MRN:  [id number]  Visit Number:  [id number]  Primary Care Provider:  Antwan Schaefer MD    Length of stay:  3    Subjective: Concerns for deep venous thrombosis/PE was confirmed by CT angio chest patient currently on heparin drip, daughter was updated and agreed to proceed with heparin drip, pros and cons and potential side effects of heparin drip was discussed with patient's daughter.  Patient's gets confused especially at night but not agitated.  Patient yesterday early evening while daughter and son visited they have discussed advanced directive and patient does not want to be intubated, electrocardioversion or CPR.  This decision was supported by family.  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 and has witnessed CPR and ACLS and does not want  sees her venous Doppler both lower extremities negative for DVT PE    Chief Complaint: Fall, PE  ----------------------------------------------------------------------------------------------------------------------  Current Hospital Meds:  carvedilol, 25 mg, Oral, Daily  cefTRIAXone, 1 g, Intravenous, Q24H  cyanocobalamin, 1,000 mcg, Intramuscular, Daily  fluticasone, 2 spray, Each Nare, Daily  Insulin Aspart, 0-7 Units, Subcutaneous, TID AC  lisinopril, 40 mg, Oral, Daily  multivitamin, 1 tablet, Oral, Daily  neomycin-polymyxin-hydrocortisone, 4 drop, Right Ear, Q6H  pantoprazole, 40 mg, Oral, Q AM  sodium chloride, 10 mL, Intravenous, Q12H      heparin, 12 Units/kg/hr, Last Rate: 10 Units/kg/hr (23)      ----------------------------------------------------------------------------------------------------------------------  Vital Signs:  Temp:  [98.4 °F (36.9 °C)-99.6 °F (37.6 °C)] 99.5 °F (37.5 °C)  Heart Rate:  [73-89] 88  Resp:  [17-20] 20  BP: (165-178)/(70-84)  178/82       Tele:       01/29/23  0111 01/29/23  0530   Weight: 90.7 kg (200 lb) 76.4 kg (168 lb 6.4 oz)     Body mass index is 24.87 kg/m².    Intake/Output Summary (Last 24 hours) at 2/1/2023 1221  Last data filed at 2/1/2023 0900  Gross per 24 hour   Intake 669.33 ml   Output 800 ml   Net -130.67 ml     Diet: Diabetic Diets; Consistent Carbohydrate; Texture: Regular Texture (IDDSI 7); Fluid Consistency: Thin (IDDSI 0)  ----------------------------------------------------------------------------------------------------------------------  Physical exam:  General: Comfortable,awake, alert, oriented to self, place, well-developed and well-nourished.  No respiratory distress.    Skin:  Skin is warm and dry. No rash noted. No pallor.    HENT:  Head:  Normocephalic and atraumatic.  Mouth:  Moist mucous membranes.    Eyes:  Conjunctivae and EOM are normal.  Pupils are equal, round, and reactive to light.  No scleral icterus.    Neck:  Neck supple.  No JVD present.    Pulmonary/Chest:  No respiratory distress, no wheezes, no crackles, with normal breath sounds and good air movement.  Cardiovascular:  Normal rate, regular rhythm and normal heart sounds with no murmur.  Abdominal:  Soft.  Bowel sounds are normal.  No distension and no tenderness.   Extremities:  No edema, no tenderness, and no deformity.  No red or swollen joints anywhere.  Strong pulses in all 4 extremities with no clubbing, no cyanosis, no edema.  Neurological:  Motor strength equal no obvious deficit, sensory grossly intact.   No cranial nerve deficit.  No tongue deviation.  No facial droop.  No slurred speech.    Genitourinary:   Back:  ----------------------------------------------------------------------------------------------------------------------  ----------------------------------------------------------------------------------------------------------------------  Results from last 7 days   Lab Units 01/29/23  0124   CK TOTAL U/L 282*      Results from last 7 days   Lab Units 02/01/23  0450 01/31/23  1447 01/30/23  1613 01/30/23  0343 01/29/23  0950 01/29/23  0649 01/29/23  0320 01/29/23  0124   CRP mg/dL  --   --  30.35*  --   --  16.58*  --  14.58*   LACTATE mmol/L  --   --   --   --  1.4 2.1* 2.3*  --    WBC 10*3/mm3 8.30 7.82  --  8.63  --  10.03  --  11.20*   HEMOGLOBIN g/dL 12.3 12.5  --  11.7*  --  13.9  --  14.7   HEMATOCRIT % 37.8 38.7  --  35.3  --  42.0  --  42.7   MCV fL 95.5 97.0  --  97.2*  --  97.0  --  95.1   MCHC g/dL 32.5 32.3  --  33.1  --  33.1  --  34.4   PLATELETS 10*3/mm3 199 161  --  150  --  195  --  215   INR   --  1.05  --   --   --   --   --  1.04         Results from last 7 days   Lab Units 01/30/23  0343 01/29/23  0649 01/29/23  0124   SODIUM mmol/L 133* 135* 136   POTASSIUM mmol/L 3.4* 3.6 3.9   MAGNESIUM mg/dL  --  1.6  --    CHLORIDE mmol/L 95* 95* 96*   CO2 mmol/L 28.0 28.7 25.2   BUN mg/dL 24* 15 16   CREATININE mg/dL 0.91 0.85 0.85   CALCIUM mg/dL 8.2* 8.8 9.1   GLUCOSE mg/dL 172* 224* 214*   ALBUMIN g/dL  --  2.8* 3.3*   BILIRUBIN mg/dL  --  0.4 0.7   ALK PHOS U/L  --  63 75   AST (SGOT) U/L  --  15 18   ALT (SGPT) U/L  --  <5 6   Estimated Creatinine Clearance: 58.5 mL/min (by C-G formula based on SCr of 0.91 mg/dL).    No results found for: AMMONIA      Blood Culture   Date Value Ref Range Status   01/29/2023 No growth at 3 days  Preliminary   01/29/2023 No growth at 3 days  Preliminary     Urine Culture   Date Value Ref Range Status   01/29/2023 >100,000 CFU/mL Escherichia coli (A)  Final     No results found for: WOUNDCX  No results found for: STOOLCX    I have personally looked at the labs and they are summarized above.  ----------------------------------------------------------------------------------------------------------------------  Imaging Results (Last 24 Hours)     Procedure Component Value Units Date/Time    US Venous Doppler Lower Extremity Bilateral (duplex) [[id number]] Collected: 01/31/23 1436      Updated: 01/31/23 1455    Narrative:      US VENOUS DOPPLER LOWER EXTREMITY BILATERAL (DUPLEX)-     CLINICAL INDICATION: D-dimer elevation COVID-19; H60.501-Unspecified  acute noninfective otitis externa, right ear; N30.01-Acute cystitis with  hematuria; G93.41-Metabolic encephalopathy; U07.1-COVID-19        COMPARISON: None available      TECHNIQUE: Color Doppler imaging was used with compression and  augmentation to evaluate the lower extremity deep venous system.     FINDINGS:   There is patent spontaneous flow from the common femoral vein through  the posterior tibial veins.  There was no internal clot or area of noncompressibility.  Normal augmentation was elicited where applicable.       Impression:         1. No evidence of deep vein thrombus  2. Baker's cyst in the left popliteal fossa     This report was finalized on 1/31/2023 2:31 PM by Dr. Brad Ye MD.       CT Angiogram Chest Pulmonary Embolism [[id number]] Collected: 01/31/23 1305     Updated: 01/31/23 1310    Narrative:      EXAM:    CT Angiography Chest With Intravenous Contrast     EXAM DATE:    1/31/2023 12:26 PM     CLINICAL HISTORY:    D-dimer elevation COVID-19; H60.501-Unspecified acute noninfective  otitis externa, right ear; N30.01-Acute cystitis with hematuria;  G93.41-Metabolic encephalopathy; U07.1-COVID-19     TECHNIQUE:    Axial computed tomographic angiography images of the chest with  intravenous contrast.  This CT exam was performed using one or more of  the following dose reduction techniques:  automated exposure control,  adjustment of the mA and/or kV according to patient size, and/or use of  iterative reconstruction technique.    MIP reconstructed images were created and reviewed.     COMPARISON:    No relevant prior studies available.     FINDINGS:    Pulmonary arteries:  Filling defects of the right pulmonary arteries  consistent with multiple pulmonary emboli. Involvement is within the  lobar and segmental branches.     Aorta:  No acute findings.  No thoracic aortic aneurysm.    Lungs:  No pulmonary parenchymal infarct identified.  Interstitial  thickening compatible with edema.  Dependent basilar atelectasis.  No  mass.    Pleural space:  Trace pleural effusions.  No pneumothorax.    Heart:  No CT evidence of right heart strain.  Cardiomegaly with  severe coronary artery calcifications.  No pericardial effusion.  Mitral  valvular calcifications.    Bones/joints:  Degenerative changes thoracic spine.  Compression type  fracture of the T11 vertebral body without bony retropulsion and only  mild height loss noted.  No dislocation.    Soft tissues:  See above.    Lymph nodes:  Unremarkable.  No enlarged lymph nodes.    Adrenals:  Right adrenal nodule or hypertrophy is noted and is low  attenuation favoring benign etiology.       Impression:      1.  Pulmonary emboli of the right lung without evidence of parenchymal  infarct or right heart strain.  2.  CHF/edema with trace effusions.  3.  Age indeterminate but likely chronic T11 compression fracture  without bony retropulsion.  4.  Cardiomegaly with severe coronary calcifications. Other nonacute  findings above.     This report was finalized on 1/31/2023 1:08 PM by Dr. Bird Donis MD.           ----------------------------------------------------------------------------------------------------------------------  Assessment and Plan:  -Acute pulmonary embolism right lung without evidence of parenchymal infarct or right heart strain  -COVID-19 without evidence of pneumonia  -B12 deficiency  -Dementia without behavioral disturbances  -Diabetes type 2 newly diagnosed  -Acute cystitis without hematuria  -Acute hypokalemia    So far patient has tolerated heparin drip, will give her dose of Lasix today.  Advanced directive has been ordered as requested by patient yesterday evening when she was not confused this was done in the presence of patient's daughter and son who supported the  patient's decision.  Fall precautions, PT OT, replace electrolytes    Disposition Home pending improvement      Gabrielle Pinon MD  02/01/23  12:21 EST

## 2023-02-02 LAB
APTT PPP: 51 SECONDS (ref 26.5–34.5)
APTT PPP: 61.6 SECONDS (ref 26.5–34.5)
APTT PPP: 68.6 SECONDS (ref 26.5–34.5)
BASOPHILS # BLD AUTO: 0.01 10*3/MM3 (ref 0–0.2)
BASOPHILS NFR BLD AUTO: 0.1 % (ref 0–1.5)
DEPRECATED RDW RBC AUTO: 43.7 FL (ref 37–54)
EOSINOPHIL # BLD AUTO: 0.15 10*3/MM3 (ref 0–0.4)
EOSINOPHIL NFR BLD AUTO: 2.1 % (ref 0.3–6.2)
ERYTHROCYTE [DISTWIDTH] IN BLOOD BY AUTOMATED COUNT: 12.3 % (ref 12.3–15.4)
GLUCOSE BLDC GLUCOMTR-MCNC: 152 MG/DL (ref 70–130)
GLUCOSE BLDC GLUCOMTR-MCNC: 161 MG/DL (ref 70–130)
GLUCOSE BLDC GLUCOMTR-MCNC: 187 MG/DL (ref 70–130)
GLUCOSE BLDC GLUCOMTR-MCNC: 189 MG/DL (ref 70–130)
GLUCOSE BLDC GLUCOMTR-MCNC: 242 MG/DL (ref 70–130)
HCT VFR BLD AUTO: 35.8 % (ref 34–46.6)
HGB BLD-MCNC: 11.9 G/DL (ref 12–15.9)
IMM GRANULOCYTES # BLD AUTO: 0.01 10*3/MM3 (ref 0–0.05)
IMM GRANULOCYTES NFR BLD AUTO: 0.1 % (ref 0–0.5)
LYMPHOCYTES # BLD AUTO: 1.17 10*3/MM3 (ref 0.7–3.1)
LYMPHOCYTES NFR BLD AUTO: 16.7 % (ref 19.6–45.3)
MCH RBC QN AUTO: 31.9 PG (ref 26.6–33)
MCHC RBC AUTO-ENTMCNC: 33.2 G/DL (ref 31.5–35.7)
MCV RBC AUTO: 96 FL (ref 79–97)
MONOCYTES # BLD AUTO: 0.84 10*3/MM3 (ref 0.1–0.9)
MONOCYTES NFR BLD AUTO: 12 % (ref 5–12)
NEUTROPHILS NFR BLD AUTO: 4.81 10*3/MM3 (ref 1.7–7)
NEUTROPHILS NFR BLD AUTO: 69 % (ref 42.7–76)
NRBC BLD AUTO-RTO: 0 /100 WBC (ref 0–0.2)
PLATELET # BLD AUTO: 191 10*3/MM3 (ref 140–450)
PMV BLD AUTO: 9.8 FL (ref 6–12)
RBC # BLD AUTO: 3.73 10*6/MM3 (ref 3.77–5.28)
WBC NRBC COR # BLD: 6.99 10*3/MM3 (ref 3.4–10.8)

## 2023-02-02 PROCEDURE — 94799 UNLISTED PULMONARY SVC/PX: CPT

## 2023-02-02 PROCEDURE — 25010000002 CYANOCOBALAMIN PER 1000 MCG: Performed by: HOSPITALIST

## 2023-02-02 PROCEDURE — 99232 SBSQ HOSP IP/OBS MODERATE 35: CPT | Performed by: HOSPITALIST

## 2023-02-02 PROCEDURE — 85730 THROMBOPLASTIN TIME PARTIAL: CPT | Performed by: HOSPITALIST

## 2023-02-02 PROCEDURE — 85025 COMPLETE CBC W/AUTO DIFF WBC: CPT | Performed by: HOSPITALIST

## 2023-02-02 PROCEDURE — 25010000002 CEFTRIAXONE PER 250 MG: Performed by: PHYSICIAN ASSISTANT

## 2023-02-02 PROCEDURE — 25010000002 FUROSEMIDE PER 20 MG: Performed by: HOSPITALIST

## 2023-02-02 PROCEDURE — 63710000001 INSULIN ASPART PER 5 UNITS: Performed by: HOSPITALIST

## 2023-02-02 PROCEDURE — 25010000002 HEPARIN (PORCINE) PER 1000 UNITS: Performed by: HOSPITALIST

## 2023-02-02 PROCEDURE — 82962 GLUCOSE BLOOD TEST: CPT

## 2023-02-02 RX ORDER — FUROSEMIDE 10 MG/ML
40 INJECTION INTRAMUSCULAR; INTRAVENOUS ONCE
Status: COMPLETED | OUTPATIENT
Start: 2023-02-02 | End: 2023-02-02

## 2023-02-02 RX ADMIN — NEOMYCIN SULFATE, POLYMYXIN B SULFATE AND HYDROCORTISONE 4 DROP: 10; 3.5; 1 SUSPENSION/ DROPS AURICULAR (OTIC) at 23:40

## 2023-02-02 RX ADMIN — FLUTICASONE PROPIONATE 2 SPRAY: 50 SPRAY, METERED NASAL at 08:46

## 2023-02-02 RX ADMIN — HEPARIN SODIUM 2300 UNITS: 5000 INJECTION INTRAVENOUS; SUBCUTANEOUS at 11:07

## 2023-02-02 RX ADMIN — INSULIN ASPART 3 UNITS: 100 INJECTION, SOLUTION INTRAVENOUS; SUBCUTANEOUS at 11:15

## 2023-02-02 RX ADMIN — PANTOPRAZOLE SODIUM 40 MG: 40 TABLET, DELAYED RELEASE ORAL at 05:39

## 2023-02-02 RX ADMIN — NEOMYCIN SULFATE, POLYMYXIN B SULFATE AND HYDROCORTISONE 4 DROP: 10; 3.5; 1 SUSPENSION/ DROPS AURICULAR (OTIC) at 05:39

## 2023-02-02 RX ADMIN — NEOMYCIN SULFATE, POLYMYXIN B SULFATE AND HYDROCORTISONE 4 DROP: 10; 3.5; 1 SUSPENSION/ DROPS AURICULAR (OTIC) at 17:32

## 2023-02-02 RX ADMIN — Medication 1 TABLET: at 08:46

## 2023-02-02 RX ADMIN — SODIUM CHLORIDE, PRESERVATIVE FREE 10 ML: 5 INJECTION INTRAVENOUS at 20:44

## 2023-02-02 RX ADMIN — SODIUM CHLORIDE, PRESERVATIVE FREE 10 ML: 5 INJECTION INTRAVENOUS at 08:46

## 2023-02-02 RX ADMIN — INSULIN ASPART 2 UNITS: 100 INJECTION, SOLUTION INTRAVENOUS; SUBCUTANEOUS at 08:45

## 2023-02-02 RX ADMIN — NEOMYCIN SULFATE, POLYMYXIN B SULFATE AND HYDROCORTISONE 4 DROP: 10; 3.5; 1 SUSPENSION/ DROPS AURICULAR (OTIC) at 11:13

## 2023-02-02 RX ADMIN — APIXABAN 10 MG: 5 TABLET, FILM COATED ORAL at 20:44

## 2023-02-02 RX ADMIN — LISINOPRIL 40 MG: 10 TABLET ORAL at 08:46

## 2023-02-02 RX ADMIN — CEFTRIAXONE 1 G: 1 INJECTION, POWDER, FOR SOLUTION INTRAMUSCULAR; INTRAVENOUS at 02:27

## 2023-02-02 RX ADMIN — FUROSEMIDE 40 MG: 10 INJECTION, SOLUTION INTRAVENOUS at 17:31

## 2023-02-02 RX ADMIN — CYANOCOBALAMIN 1000 MCG: 1000 INJECTION, SOLUTION INTRAMUSCULAR; SUBCUTANEOUS at 08:45

## 2023-02-02 RX ADMIN — CARVEDILOL 25 MG: 25 TABLET, FILM COATED ORAL at 08:46

## 2023-02-02 RX ADMIN — INSULIN ASPART 2 UNITS: 100 INJECTION, SOLUTION INTRAVENOUS; SUBCUTANEOUS at 17:32

## 2023-02-02 NOTE — PROGRESS NOTES
[facility]     Patient Identification:  Name:  Vani Gee  Age:  82 y.o.  Sex:  female  :  1940  MRN:  [id number]  Visit Number:  [id number]  Primary Care Provider:  Antwan Schaefer MD    Length of stay:  4    Subjective: Patient seen and examined, patient is in bed she is no longer confused, denies pain shortness of breath dyspnea or coughing, denies diarrhea, she is eating fairly well, Reported no issues of confusion today, she has good urine output after Lasix yesterday    Chief Complaint: Fall, worsening confusion  ----------------------------------------------------------------------------------------------------------------------  [facility] Meds:  carvedilol, 25 mg, Oral, Daily  cefTRIAXone, 1 g, Intravenous, Q24H  cyanocobalamin, 1,000 mcg, Intramuscular, Daily  fluticasone, 2 spray, Each Nare, Daily  Insulin Aspart, 0-7 Units, Subcutaneous, TID AC  lisinopril, 40 mg, Oral, Daily  multivitamin, 1 tablet, Oral, Daily  neomycin-polymyxin-hydrocortisone, 4 drop, Right Ear, Q6H  pantoprazole, 40 mg, Oral, Q AM  sodium chloride, 10 mL, Intravenous, Q12H      heparin, 12 Units/kg/hr, Last Rate: 16 Units/kg/hr (23 1108)      ----------------------------------------------------------------------------------------------------------------------  Vital Signs:  Temp:  [97.9 °F (36.6 °C)-98.6 °F (37 °C)] 97.9 °F (36.6 °C)  Heart Rate:  [] 75  Resp:  [16-20] 20  BP: (104-160)/(50-72) 112/50       Tele:       23  0111 23  0530   Weight: 90.7 kg (200 lb) 76.4 kg (168 lb 6.4 oz)     Body mass index is 24.87 kg/m².    Intake/Output Summary (Last 24 hours) at 2023 1550  Last data filed at 2023 1500  Gross per 24 hour   Intake 1428.8 ml   Output 1000 ml   Net 428.8 ml     Diet: Diabetic Diets; Consistent Carbohydrate; Texture: Regular Texture (IDDSI 7); Fluid Consistency: Thin (IDDSI  0)  ----------------------------------------------------------------------------------------------------------------------  Physical exam:  General: Comfortable,awake, alert, oriented to self, place, and time, well-developed and well-nourished.  No respiratory distress.    Skin:  Skin is warm and dry. No rash noted. No pallor.    HENT:  Head:  Normocephalic and atraumatic.  Mouth:  Moist mucous membranes.    Eyes:  Conjunctivae and EOM are normal.  Pupils are equal, round, and reactive to light.  No scleral icterus.    Neck:  Neck supple.  No JVD present.  Mild hepatojugular reflux   Pulmonary/Chest:  No respiratory distress, no wheezes, no crackles, with normal breath sounds and good air movement.  Cardiovascular:  Normal rate, regular rhythm and normal heart sounds with no murmur.  Abdominal:  Soft.  Bowel sounds are normal.  No distension and no tenderness.   Extremities: Improvement of edema on both lower extremity with wrinkling of the skin, chronic stasis discoloration of both legs   Neurological:  Motor strength equal no obvious deficit, sensory grossly intact.   No cranial nerve deficit.  No tongue deviation.  No facial droop.  No slurred speech.    Genitourinary: External urinary catheter  Back:  ----------------------------------------------------------------------------------------------------------------------  ----------------------------------------------------------------------------------------------------------------------  Results from last 7 days   Lab Units 01/29/23  0124   CK TOTAL U/L 282*     Results from last 7 days   Lab Units 02/02/23  0402 02/01/23  0450 01/31/23  1447 01/30/23  1613 01/30/23  0343 01/29/23  0950 01/29/23  0649 01/29/23  0320 01/29/23  0124   CRP mg/dL  --   --   --  30.35*  --   --  16.58*  --  14.58*   LACTATE mmol/L  --   --   --   --   --  1.4 2.1* 2.3*  --    WBC 10*3/mm3 6.99 8.30 7.82  --    < >  --  10.03  --  11.20*   HEMOGLOBIN g/dL 11.9* 12.3 12.5  --    < >  --   13.9  --  14.7   HEMATOCRIT % 35.8 37.8 38.7  --    < >  --  42.0  --  42.7   MCV fL 96.0 95.5 97.0  --    < >  --  97.0  --  95.1   MCHC g/dL 33.2 32.5 32.3  --    < >  --  33.1  --  34.4   PLATELETS 10*3/mm3 191 199 161  --    < >  --  195  --  215   INR   --   --  1.05  --   --   --   --   --  1.04    < > = values in this interval not displayed.         Results from last 7 days   Lab Units 01/30/23  0343 01/29/23  0649 01/29/23  0124   SODIUM mmol/L 133* 135* 136   POTASSIUM mmol/L 3.4* 3.6 3.9   MAGNESIUM mg/dL  --  1.6  --    CHLORIDE mmol/L 95* 95* 96*   CO2 mmol/L 28.0 28.7 25.2   BUN mg/dL 24* 15 16   CREATININE mg/dL 0.91 0.85 0.85   CALCIUM mg/dL 8.2* 8.8 9.1   GLUCOSE mg/dL 172* 224* 214*   ALBUMIN g/dL  --  2.8* 3.3*   BILIRUBIN mg/dL  --  0.4 0.7   ALK PHOS U/L  --  63 75   AST (SGOT) U/L  --  15 18   ALT (SGPT) U/L  --  <5 6   Estimated Creatinine Clearance: 58.5 mL/min (by C-G formula based on SCr of 0.91 mg/dL).    No results found for: AMMONIA      Blood Culture   Date Value Ref Range Status   01/29/2023 No growth at 4 days  Preliminary   01/29/2023 No growth at 4 days  Preliminary     Urine Culture   Date Value Ref Range Status   01/29/2023 >100,000 CFU/mL Escherichia coli (A)  Final     No results found for: WOUNDCX  No results found for: STOOLCX    I have personally looked at the labs and they are summarized above.  ----------------------------------------------------------------------------------------------------------------------  Imaging Results (Last 24 Hours)     ** No results found for the last 24 hours. **        ----------------------------------------------------------------------------------------------------------------------  Assessment and Plan:  -COVID-19 infection with no pneumonia  -Right-sided PE acute tolerating heparin  -Dementia without behavioral disturbances  -B12 deficiency  -Diabetes type 2 newly diagnosed  -Acute cystitis without hematuria  -Sepsis present on admission  secondary to cystitis and COVID-19  -Essential hypertension    Patient is clinically improving, maintaining O2 saturations 92 to 95% on room air, will transition to Eliquis, continue current regimen, Lasix IV today, hopefully home tomorrow or the next day depending on clinical improvement      Disposition nursing home soon      Gabrielle Pinon MD  02/02/23  15:50 EST

## 2023-02-02 NOTE — PLAN OF CARE
Goal Outcome Evaluation:  Plan of Care Reviewed With: patient, daughter        Progress: no change  Outcome Evaluation: Pt. resting in bed at this time. Wound care completed. Pt. has no complaints at this time. VSS, no acute changes at this time. Will continue with plan of care.

## 2023-02-02 NOTE — CASE MANAGEMENT/SOCIAL WORK
Discharge Planning Assessment   [place]     Patient Name: Vani Gee  MRN: [id number]  Today's Date: 2/2/2023    Admit Date: 1/29/2023       Discharge Plan     Row Name 02/02/23 1614       Plan    Plan SS discussed pt with [facility] per [place] and pt has been approved for admission when medically stable. Pt was discussed in [facility] today. Pt is not medically ready for discharge to a nursing home per Dr. Pinon. SS will follow and assist as needed with discharge planning.              Continued Care and Services - Admitted Since 1/29/2023     Destination Coordination complete.    Service Provider Request Status Selected Services Address Phone Fax Patient Preferred    [facility] [place] [facility]  Selected Nursing Home [address] [place] [id number] [phone] --    [facility] Accepted N/A [address] [place] [id number] [phone] --              Expected Discharge Date and Time     Expected Discharge Date Expected Discharge Time    Feb 6, 2023         ARDEN Fletcher

## 2023-02-02 NOTE — PLAN OF CARE
Goal Outcome Evaluation:  Plan of Care Reviewed With: patient           Outcome Evaluation: Pt resting in bed at this time. Wound care completed. No complaints at this time. Will continue with plan of care.

## 2023-02-03 VITALS
DIASTOLIC BLOOD PRESSURE: 58 MMHG | HEIGHT: 69 IN | BODY MASS INDEX: 24.94 KG/M2 | HEART RATE: 72 BPM | TEMPERATURE: 98.1 F | RESPIRATION RATE: 18 BRPM | SYSTOLIC BLOOD PRESSURE: 146 MMHG | WEIGHT: 168.4 LBS | OXYGEN SATURATION: 96 %

## 2023-02-03 PROBLEM — D89.831 CYTOKINE RELEASE SYNDROME, GRADE 1: Status: ACTIVE | Noted: 2023-02-03

## 2023-02-03 LAB
ANION GAP SERPL CALCULATED.3IONS-SCNC: 8.7 MMOL/L (ref 5–15)
BACTERIA SPEC AEROBE CULT: NORMAL
BACTERIA SPEC AEROBE CULT: NORMAL
BASOPHILS # BLD AUTO: 0.01 10*3/MM3 (ref 0–0.2)
BASOPHILS NFR BLD AUTO: 0.1 % (ref 0–1.5)
BUN SERPL-MCNC: 34 MG/DL (ref 8–23)
BUN/CREAT SERPL: 37.8 (ref 7–25)
CALCIUM SPEC-SCNC: 8.5 MG/DL (ref 8.6–10.5)
CHLORIDE SERPL-SCNC: 92 MMOL/L (ref 98–107)
CO2 SERPL-SCNC: 29.3 MMOL/L (ref 22–29)
CREAT SERPL-MCNC: 0.9 MG/DL (ref 0.57–1)
CRP SERPL-MCNC: 19.85 MG/DL (ref 0–0.5)
DEPRECATED RDW RBC AUTO: 43 FL (ref 37–54)
EGFRCR SERPLBLD CKD-EPI 2021: 64.4 ML/MIN/1.73
EOSINOPHIL # BLD AUTO: 0.22 10*3/MM3 (ref 0–0.4)
EOSINOPHIL NFR BLD AUTO: 2.8 % (ref 0.3–6.2)
ERYTHROCYTE [DISTWIDTH] IN BLOOD BY AUTOMATED COUNT: 12.1 % (ref 12.3–15.4)
GLUCOSE BLDC GLUCOMTR-MCNC: 173 MG/DL (ref 70–130)
GLUCOSE BLDC GLUCOMTR-MCNC: 255 MG/DL (ref 70–130)
GLUCOSE SERPL-MCNC: 161 MG/DL (ref 65–99)
HCT VFR BLD AUTO: 34.7 % (ref 34–46.6)
HGB BLD-MCNC: 11.6 G/DL (ref 12–15.9)
IMM GRANULOCYTES # BLD AUTO: 0.03 10*3/MM3 (ref 0–0.05)
IMM GRANULOCYTES NFR BLD AUTO: 0.4 % (ref 0–0.5)
LYMPHOCYTES # BLD AUTO: 1.15 10*3/MM3 (ref 0.7–3.1)
LYMPHOCYTES NFR BLD AUTO: 14.6 % (ref 19.6–45.3)
MCH RBC QN AUTO: 32.2 PG (ref 26.6–33)
MCHC RBC AUTO-ENTMCNC: 33.4 G/DL (ref 31.5–35.7)
MCV RBC AUTO: 96.4 FL (ref 79–97)
MONOCYTES # BLD AUTO: 0.87 10*3/MM3 (ref 0.1–0.9)
MONOCYTES NFR BLD AUTO: 11 % (ref 5–12)
NEUTROPHILS NFR BLD AUTO: 5.6 10*3/MM3 (ref 1.7–7)
NEUTROPHILS NFR BLD AUTO: 71.1 % (ref 42.7–76)
NRBC BLD AUTO-RTO: 0 /100 WBC (ref 0–0.2)
PLATELET # BLD AUTO: 211 10*3/MM3 (ref 140–450)
PMV BLD AUTO: 10.5 FL (ref 6–12)
POTASSIUM SERPL-SCNC: 3.5 MMOL/L (ref 3.5–5.2)
RBC # BLD AUTO: 3.6 10*6/MM3 (ref 3.77–5.28)
SODIUM SERPL-SCNC: 130 MMOL/L (ref 136–145)
WBC NRBC COR # BLD: 7.88 10*3/MM3 (ref 3.4–10.8)

## 2023-02-03 PROCEDURE — 97162 PT EVAL MOD COMPLEX 30 MIN: CPT

## 2023-02-03 PROCEDURE — 86140 C-REACTIVE PROTEIN: CPT | Performed by: HOSPITALIST

## 2023-02-03 PROCEDURE — 82962 GLUCOSE BLOOD TEST: CPT

## 2023-02-03 PROCEDURE — 63710000001 INSULIN ASPART PER 5 UNITS: Performed by: HOSPITALIST

## 2023-02-03 PROCEDURE — 25010000002 CYANOCOBALAMIN PER 1000 MCG: Performed by: HOSPITALIST

## 2023-02-03 PROCEDURE — 80048 BASIC METABOLIC PNL TOTAL CA: CPT | Performed by: HOSPITALIST

## 2023-02-03 PROCEDURE — 85025 COMPLETE CBC W/AUTO DIFF WBC: CPT | Performed by: HOSPITALIST

## 2023-02-03 PROCEDURE — 99239 HOSP IP/OBS DSCHRG MGMT >30: CPT | Performed by: HOSPITALIST

## 2023-02-03 PROCEDURE — 25010000002 CEFTRIAXONE PER 250 MG: Performed by: PHYSICIAN ASSISTANT

## 2023-02-03 RX ORDER — POTASSIUM CHLORIDE 20 MEQ/1
40 TABLET, EXTENDED RELEASE ORAL EVERY 4 HOURS
Status: COMPLETED | OUTPATIENT
Start: 2023-02-03 | End: 2023-02-03

## 2023-02-03 RX ORDER — AMLODIPINE BESYLATE 5 MG/1
5 TABLET ORAL
Status: DISCONTINUED | OUTPATIENT
Start: 2023-02-03 | End: 2023-02-03 | Stop reason: HOSPADM

## 2023-02-03 RX ORDER — CYANOCOBALAMIN 1000 UG/ML
1000 INJECTION, SOLUTION INTRAMUSCULAR; SUBCUTANEOUS DAILY
Qty: 3 ML | Refills: 0 | Status: SHIPPED | OUTPATIENT
Start: 2023-02-04 | End: 2023-02-07

## 2023-02-03 RX ORDER — GUAIFENESIN/DEXTROMETHORPHAN 100-10MG/5
5 SYRUP ORAL EVERY 4 HOURS PRN
Qty: 354 ML | Refills: 0 | Status: SHIPPED | OUTPATIENT
Start: 2023-02-03

## 2023-02-03 RX ORDER — PANTOPRAZOLE SODIUM 40 MG/1
40 TABLET, DELAYED RELEASE ORAL
Qty: 30 TABLET | Refills: 3 | Status: SHIPPED | OUTPATIENT
Start: 2023-02-04

## 2023-02-03 RX ORDER — CEFDINIR 300 MG/1
300 CAPSULE ORAL 2 TIMES DAILY
Qty: 3 CAPSULE | Refills: 0 | Status: SHIPPED | OUTPATIENT
Start: 2023-02-03

## 2023-02-03 RX ORDER — ACETAMINOPHEN 325 MG/1
650 TABLET ORAL EVERY 6 HOURS PRN
Qty: 30 TABLET | Refills: 0 | Status: SHIPPED | OUTPATIENT
Start: 2023-02-03

## 2023-02-03 RX ORDER — AMLODIPINE BESYLATE 5 MG/1
5 TABLET ORAL
Qty: 30 TABLET | Refills: 3 | Status: SHIPPED | OUTPATIENT
Start: 2023-02-03

## 2023-02-03 RX ADMIN — CEFTRIAXONE 1 G: 1 INJECTION, POWDER, FOR SOLUTION INTRAMUSCULAR; INTRAVENOUS at 02:20

## 2023-02-03 RX ADMIN — NEOMYCIN SULFATE, POLYMYXIN B SULFATE AND HYDROCORTISONE 4 DROP: 10; 3.5; 1 SUSPENSION/ DROPS AURICULAR (OTIC) at 05:33

## 2023-02-03 RX ADMIN — LISINOPRIL 40 MG: 10 TABLET ORAL at 08:02

## 2023-02-03 RX ADMIN — INSULIN ASPART 2 UNITS: 100 INJECTION, SOLUTION INTRAVENOUS; SUBCUTANEOUS at 08:02

## 2023-02-03 RX ADMIN — POTASSIUM CHLORIDE 40 MEQ: 1500 TABLET, EXTENDED RELEASE ORAL at 05:33

## 2023-02-03 RX ADMIN — PANTOPRAZOLE SODIUM 40 MG: 40 TABLET, DELAYED RELEASE ORAL at 05:33

## 2023-02-03 RX ADMIN — Medication 1 TABLET: at 08:02

## 2023-02-03 RX ADMIN — NEOMYCIN SULFATE, POLYMYXIN B SULFATE AND HYDROCORTISONE 4 DROP: 10; 3.5; 1 SUSPENSION/ DROPS AURICULAR (OTIC) at 11:32

## 2023-02-03 RX ADMIN — Medication 10 MG: at 10:12

## 2023-02-03 RX ADMIN — FLUTICASONE PROPIONATE 2 SPRAY: 50 SPRAY, METERED NASAL at 08:04

## 2023-02-03 RX ADMIN — CYANOCOBALAMIN 1000 MCG: 1000 INJECTION, SOLUTION INTRAMUSCULAR; SUBCUTANEOUS at 08:02

## 2023-02-03 RX ADMIN — INSULIN ASPART 4 UNITS: 100 INJECTION, SOLUTION INTRAVENOUS; SUBCUTANEOUS at 11:32

## 2023-02-03 RX ADMIN — SODIUM CHLORIDE, PRESERVATIVE FREE 10 ML: 5 INJECTION INTRAVENOUS at 08:03

## 2023-02-03 RX ADMIN — CARVEDILOL 25 MG: 25 TABLET, FILM COATED ORAL at 08:02

## 2023-02-03 RX ADMIN — POTASSIUM CHLORIDE 40 MEQ: 1500 TABLET, EXTENDED RELEASE ORAL at 09:58

## 2023-02-03 RX ADMIN — AMLODIPINE BESYLATE 5 MG: 5 TABLET ORAL at 09:56

## 2023-02-03 RX ADMIN — APIXABAN 10 MG: 5 TABLET, FILM COATED ORAL at 08:02

## 2023-02-03 NOTE — PLAN OF CARE
Goal Outcome Evaluation:  Plan of Care Reviewed With: patient, daughter        Progress: improving  Outcome Evaluation: Pt. resting in bed at this time. VSS, Pt. seems to have less confusion this shift, only confused to situation at times. Wound care completed. Will continue with plan of care.

## 2023-02-03 NOTE — DISCHARGE SUMMARY
[facility] HOSPITALIST MEDICINE DISCHARGE SUMMARY    Patient Identification:  Name:  Vani Gee  Age:  82 y.o.  Sex:  female  :  1940  MRN:  [id number]  Visit Number:  [id number]    Date of Admission: 2023  Date of Discharge: February 3, 2023  DISCHARGE DISPOSITION   Stable  PCP: Antwan Schaefer MD    DISCHARGE DIAGNOSIS : Acute COVID infection without respiratory symptoms, acute right-sided PE by CT scan of the chest negative venous Doppler both lower extremity, dementia with occasional confusion, newly diagnosed B12, newly diagnosed diabetes type 2 addressed with diet and sliding scale insulin coverage, essential hypertension suboptimally controlled.     HOSPITAL COURSE  Patient is a 82 y.o. female presented to [facility] complaining she lives alone with her daughter nearby found on the floor somewhat confused, at the emergency room she was noted to be COVID-positive she does not have any pneumonia noted to have cystitis and dehydration.  CT of the brain was unremarkable.  During my evaluation, patient has chronic stasis both legs D-dimer was elevated, CT scan of the chest PE protocol confirmed suspicion for possible PE, no right-sided heart ventricular strain noted.  Troponin was negative.  She was placed on IV heparin with good tolerance, she was transitioned to Eliquis.  With her dementia and mild macrocytosis work-up was also done she has B12 deficiency and is currently being replaced, she was also noted to have diabetes and was placed on diabetic diet with good results.  The rest of her stay was uneventful her O2 sat did drop as low as 92% on room air.  For the past 2 days her O2 sat on room air has been 96%, edema of both lower extremity has resolved after receiving Lasix.  Discussed plan of care with daughter who is also POA and a nurse at one of the nursing home facility she was the patient to be transferred to the nursing home which she has a bed hence  this transfer.  She will require further physical therapy for recuperation at the nursing home.  Regarding her PE, likely this is triggered from COVID-19 infection, appointment was made with hematology for further guidance for management regarding duration and further need for work-up of hypercoagulable state if indicated.details.      VITAL SIGNS:      01/29/23  0111 01/29/23  0530   Weight: 90.7 kg (200 lb) 76.4 kg (168 lb 6.4 oz)     Body mass index is 24.87 kg/m².  Vitals:    02/03/23 1100   BP: 146/58   Pulse: 72   Resp:    Temp:    SpO2:      PHYSICAL EXAM:  General: Comfortable,awake, alert, oriented to self, place, and time, well-developed and well-nourished.  No respiratory distress.    Skin:  Skin is warm and dry. No rash noted. No pallor.    HENT:  Head:  Normocephalic and atraumatic.  Mouth:  Moist mucous membranes.    Eyes:  Conjunctivae and EOM are normal.  Pupils are equal, round, and reactive to light.  No scleral icterus.    Neck:  Neck supple.  No JVD present.    Pulmonary/Chest:  No respiratory distress, no wheezes, no crackles, with normal breath sounds and good air movement.  Cardiovascular:  Normal rate, regular rhythm and normal heart sounds with no murmur.  Abdominal:  Soft.  Bowel sounds are normal.  No distension and no tenderness.   Extremities: Mild stasis discoloration both lower extremities no edema  Neurological:  Motor strength equal no obvious deficit, sensory grossly intact.   No cranial nerve deficit.  No tongue deviation.  No facial droop.  No slurred speech.    Genitourinary: No Singletary catheter  Back:  ----------    DISCHARGE MEDICATIONS:     Discharge Medications      New Medications      Instructions Start Date   acetaminophen 325 MG tablet  Commonly known as: TYLENOL   650 mg, Oral, Every 6 Hours PRN      amLODIPine 5 MG tablet  Commonly known as: NORVASC   5 mg, Oral, Every 24 Hours Scheduled      apixaban 5 MG tablet tablet  Commonly known as: ELIQUIS   10 mg, Oral, Every 12  Hours Scheduled      apixaban 5 MG tablet tablet  Commonly known as: ELIQUIS   5 mg, Oral, Every 12 Hours Scheduled   Start Date: February 10, 2023     cefdinir 300 MG capsule  Commonly known as: OMNICEF   300 mg, Oral, 2 Times Daily      cyanocobalamin 1000 MCG/ML injection   1,000 mcg, Intramuscular, Daily, Then once a month thereafter   Start Date: February 4, 2023     guaiFENesin-dextromethorphan 100-10 MG/5ML syrup  Commonly known as: ROBITUSSIN DM   5 mL, Oral, Every 4 Hours PRN      pantoprazole 40 MG EC tablet  Commonly known as: PROTONIX   40 mg, Oral, Every Early Morning   Start Date: February 4, 2023        Continue These Medications      Instructions Start Date   carvedilol 25 MG tablet  Commonly known as: COREG   25 mg, Oral, Daily      Diclofenac Sodium 1 % gel gel  Commonly known as: VOLTAREN   2 g, Topical, 2 Times Daily PRN      lisinopril 40 MG tablet  Commonly known as: PRINIVIL,ZESTRIL   40 mg, Oral, Daily               Your Scheduled Appointments    Mar 01, 2023  1:00 PM  HOSITAL FOLLOW-UP INITIAL VISIT with Teodora Merino MD  [facility] HEMATOLOGY & ONCOLOGY ([place]) [address] [facility] [address]  [facility] [address] [phone]             Additional Instructions for the Follow-ups that You Need to Schedule     Discharge Follow-up with PCP   As directed       Currently Documented PCP:    Antwan Schaefer MD    PCP Phone Number:    [phone]     Follow Up Details: Antwan Schaefer MD (posthospitalization follow-up)         Discharge Follow-up with Specified Provider: Hematology; 1 Month   As directed      To: Hematology    Follow Up: 1 Month    Follow Up Details: PE            Contact information for follow-up providers     Antwan Schaefer MD .    Specialty: Family Medicine  Why: Antwan Schaefer MD (posthospitalization follow-up)  Contact information:  [address] [place] [address]  [phone]             Antwan Schaefer MD .    Specialty: Family  Medicine  Contact information:  [address] [place] [address]  [place] [address]  [phone]                   Contact information for after-discharge care     Destination     [facility] .    Service: Nursing Home  Contact information:  [address] [place] [address] [phone]                                  The ASCVD Risk score (Whit URBINA, et al., 2019) failed to calculate for the following reasons:    The 2019 ASCVD risk score is only valid for ages 40 to 79     Gabrielle Pinon MD  02/03/23  14:53 EST    Please note that this discharge summary required more than 30 minutes to complete.

## 2023-02-03 NOTE — CASE MANAGEMENT/SOCIAL WORK
Discharge Planning Assessment  [facility]     Patient Name: Vani Gee  MRN: [id number]  Today's Date: 2/3/2023    Admit Date: 1/29/2023     Discharge Plan     Row Name 02/03/23 1021       Plan    Final Discharge Disposition Code 03 - skilled nursing facility (SNF)    Final Note Pt is being discharged to [facility] today. SS notified [facility] per [place] and she voices needing to find an accepting doctor before pt can be admitted. [place] to print discharge orders. [place] to notify SS after discharge orders are printed and nurse can call report. SS to follow.    11:27: SS received a message from [place] at [facility] stating pt has been accepted by the [facility] doctor and RN can call report. SS contaced daughter, Natalia who is agreeable for pt to be discharged to [facility] today. RN to call report to [facility]. SS completed EMS PCS form and will arrange EMS once pt is ready. SS to follow.    11:20: SS contacted [facility] to arrange [facility]. No other needs identified.              Continued Care and Services - Admitted Since 1/29/2023     Destination Coordination complete.    Service Provider Request Status Selected Services Address Phone Fax Patient Preferred    [facility]  Selected Nursing Home [address] [place] [address] [phone] --              Expected Discharge Date and Time     Expected Discharge Date Expected Discharge Time    Feb 3, 2023         ARDEN Fletcher

## 2023-02-03 NOTE — PLAN OF CARE
Goal Outcome Evaluation:  Plan of Care Reviewed With: patient           Outcome Evaluation: Patient is resting in bed, VSS on RA. Disoriented x3, no acute changes

## 2023-02-03 NOTE — PHARMACY PATIENT ASSISTANCE
Pharmacy evaluated the cost of Eliquis for patient. Eliquis is covered on patient's insurance with a copay of $4.30.     Thank you,    Ibis Thompson, PharmD  02/03/23  16:00 EST

## 2023-02-03 NOTE — THERAPY EVALUATION
Acute Care - Physical Therapy Initial Evaluation   [place]     Patient Name: Vani Gee  : 1940  MRN: [id number]  Today's Date: 2/3/2023   Onset of Illness/Injury or Date of Surgery: 23  Visit Dx:     ICD-10-CM ICD-9-CM   1. Acute otitis externa of right ear, unspecified type  H60.501 380.10   2. Acute cystitis with hematuria  N30.01 595.0   3. Acute metabolic encephalopathy  G93.41 348.31   4. COVID-19  U07.1 079.89     Patient Active Problem List   Diagnosis   • UTI (urinary tract infection)   • Cytokine release syndrome, grade 1     Past Medical History:   Diagnosis Date   • Arthritis    • Dementia (HCC)    • Essential hypertension    • Gout      History reviewed. No pertinent surgical history.  PT Assessment (last 12 hours)     PT Evaluation and Treatment     Row Name 23 1100          Physical Therapy Time and Intention    Document Type therapy note (daily note)  -     Mode of Treatment physical therapy  -     Patient Effort fair  -     Symptoms Noted During/After Treatment increased pain  -     Comment Pt seen for 2nd evaluation this date prior to D/C. Pt is being D/C'd to [facility] home this date. Pt states she was not ambulatory however was able to perform some bed mobility/minimally  -     Row Name 23 1100          General Information    Patient Profile Reviewed yes  -     Row Name 23 1100          Range of Motion Comprehensive    Comment, General Range of Motion AROM severely limited. PROM grossly 25% of normal/functional limits.  -     Row Name 23 1100          Strength Comprehensive (MMT)    Comment, General Manual Muscle Testing (MMT) Assessment Demonstrates 1/5 MMT  -     Row Name 23 1100          Bed Mobility    Comment, (Bed Mobility) Pt asked to participate/try to roll in bed for evaluation without success. Pt unwilling to attempt/participate.  -     Row Name             Wound 23 1559 Bilateral medial gluteal MASD (Moisture  associated skin damage)    Wound - Properties Group Placement Date: 01/30/23 -TW Placement Time: 1559 -TW Present on Hospital Admission: Y  -TW Side: Bilateral  -TW Orientation: medial  -TW Location: gluteal  -TW Primary Wound Type: MASD  -TW    Retired Wound - Properties Group Placement Date: 01/30/23 -TW Placement Time: 1559 -TW Present on Hospital Admission: Y  -TW Side: Bilateral  -TW Orientation: medial  -TW Location: gluteal  -TW Primary Wound Type: MASD  -TW    Retired Wound - Properties Group Date first assessed: 01/30/23 -TW Time first assessed: 1559 -TW Present on Hospital Admission: Y  -TW Side: Bilateral  -TW Location: gluteal  -TW Primary Wound Type: MASD  -TW    Row Name 02/03/23 1100          Plan of Care Review    Outcome Evaluation Pt has been D/C'd this date however demonstrates lack of motivation to improve mobility therefore not appropriate for therapy. Pt educated on ROM benefit and encouraged to participate with nsg home therapy.  -     Row Name 02/03/23 1100          Positioning and Restraints    Pre-Treatment Position in bed  -     Post Treatment Position bed  -     In Bed supine;call light within reach  -     Row Name 02/03/23 1100          Therapy Assessment/Plan (PT)    Therapy Frequency (PT) evaluation only  -           User Key  (r) = Recorded By, (t) = Taken By, (c) = Cosigned By    Initials Name Provider Type    Elva Durand, RN Registered Nurse    Mandie Washburn, PT Physical Therapist                  PT Recommendation and Plan  Anticipated Discharge Disposition (PT): extended care facility, skilled nursing facility  Therapy Frequency (PT): evaluation only  Outcome Evaluation: Pt has been D/C'd this date however demonstrates lack of motivation to improve mobility therefore not appropriate for therapy. Pt educated on ROM benefit and encouraged to participate with nsg home therapy.       Time Calculation:    PT Charges     Row Name 02/03/23 9922              Time Calculation    Start Time 1000  -KH      PT Received On 02/03/23  -            User Key  (r) = Recorded By, (t) = Taken By, (c) = Cosigned By    Initials Name Provider Type    Mandie Washburn, PT Physical Therapist              Therapy Charges for Today     Code Description Service Date Service Provider Modifiers Qty    99266766737 HC PT EVAL MOD COMPLEXITY 4 2/3/2023 Mandie Bermudez, PT GP 1          PT G-Codes  AM-PAC 6 Clicks Score (PT): 6    Mandie Bermudez, PT  2/3/2023

## 2023-06-16 ENCOUNTER — LAB REQUISITION (OUTPATIENT)
Dept: LAB | Facility: HOSPITAL | Age: 83
End: 2023-06-16
Payer: MEDICARE

## 2023-06-16 DIAGNOSIS — N39.0 URINARY TRACT INFECTION, SITE NOT SPECIFIED: ICD-10-CM

## 2023-06-16 LAB

## 2023-06-16 PROCEDURE — 81001 URINALYSIS AUTO W/SCOPE: CPT | Performed by: INTERNAL MEDICINE

## 2024-03-28 ENCOUNTER — HOSPITAL ENCOUNTER (EMERGENCY)
Facility: HOSPITAL | Age: 84
Discharge: HOME | End: 2024-03-28
Attending: EMERGENCY MEDICINE
Payer: MEDICARE

## 2024-03-28 ENCOUNTER — APPOINTMENT (OUTPATIENT)
Dept: GENERAL RADIOLOGY | Facility: HOSPITAL | Age: 84
End: 2024-03-28
Payer: MEDICARE

## 2024-03-28 ENCOUNTER — APPOINTMENT (OUTPATIENT)
Dept: CT IMAGING | Facility: HOSPITAL | Age: 84
End: 2024-03-28
Payer: MEDICARE

## 2024-03-28 VITALS
OXYGEN SATURATION: 94 % | HEART RATE: 82 BPM | DIASTOLIC BLOOD PRESSURE: 90 MMHG | SYSTOLIC BLOOD PRESSURE: 132 MMHG | RESPIRATION RATE: 17 BRPM | HEIGHT: 69 IN | TEMPERATURE: 97.6 F | BODY MASS INDEX: 24.95 KG/M2 | WEIGHT: 168.43 LBS

## 2024-03-28 DIAGNOSIS — M25.461 EFFUSION OF RIGHT KNEE: ICD-10-CM

## 2024-03-28 DIAGNOSIS — M19.011 OSTEOARTHRITIS OF RIGHT GLENOHUMERAL JOINT: ICD-10-CM

## 2024-03-28 DIAGNOSIS — M17.11 TRICOMPARTMENT OSTEOARTHRITIS OF RIGHT KNEE: Primary | ICD-10-CM
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
## 2024-03-28 PROCEDURE — 73562 X-RAY EXAM OF KNEE 3: CPT

## 2024-03-28 PROCEDURE — 73030 X-RAY EXAM OF SHOULDER: CPT

## 2024-03-28 PROCEDURE — 73030 X-RAY EXAM OF SHOULDER: CPT | Performed by: RADIOLOGY

## 2024-03-28 PROCEDURE — 70450 CT HEAD/BRAIN W/O DYE: CPT

## 2024-03-28 PROCEDURE — 70450 CT HEAD/BRAIN W/O DYE: CPT | Performed by: RADIOLOGY

## 2024-03-28 PROCEDURE — 73562 X-RAY EXAM OF KNEE 3: CPT | Performed by: RADIOLOGY

## 2024-03-28 PROCEDURE — 72125 CT NECK SPINE W/O DYE: CPT

## 2024-03-28 PROCEDURE — 99284 EMERGENCY DEPT VISIT MOD MDM: CPT

## 2024-03-28 PROCEDURE — 72125 CT NECK SPINE W/O DYE: CPT | Performed by: RADIOLOGY

## 2024-03-28 NOTE — ED PROVIDER NOTES
Subjective   History of Present Illness  83-year-old female with past medical history of arthritis, dementia, hypertension, and gout presents to the emergency room via EMS after a fall she sustained at the nursing home.  Patient apparently fell while trying to get out of bed and struck her head on the nightstand and had unknown LOC.  Patient does take chronic anticoagulation.  Patient does complain of right shoulder pain and right knee pain.  Denies any neck pain, back pain, chest pain, or abdominal pain.  Denies any other complaints or concerns at this time.    History provided by:  EMS personnel and nursing home  History limited by:  Dementia   used: No        Review of Systems   Unable to perform ROS: Dementia   Constitutional: Negative.  Negative for fever.   HENT: Negative.     Respiratory: Negative.     Cardiovascular: Negative.  Negative for chest pain.   Gastrointestinal: Negative.  Negative for abdominal pain.   Endocrine: Negative.    Genitourinary: Negative.  Negative for dysuria.   Musculoskeletal:         (+) right shoulder pain, right knee pain   Skin: Negative.    Neurological: Negative.    Psychiatric/Behavioral: Negative.     All other systems reviewed and are negative.      Past Medical History:   Diagnosis Date    Arthritis     Dementia     Essential hypertension     Gout        No Known Allergies    No past surgical history on file.    No family history on file.    Social History     Socioeconomic History    Marital status: Single   Tobacco Use    Smoking status: Never    Smokeless tobacco: Never   Vaping Use    Vaping status: Never Used   Substance and Sexual Activity    Alcohol use: Never    Drug use: Never    Sexual activity: Defer           Objective   Physical Exam  Vitals and nursing note reviewed.   Constitutional:       General: She is not in acute distress.     Appearance: She is well-developed. She is not diaphoretic.   HENT:      Head: Normocephalic and atraumatic.       Right Ear: External ear normal.      Left Ear: External ear normal.      Nose: Nose normal.   Eyes:      Conjunctiva/sclera: Conjunctivae normal.      Pupils: Pupils are equal, round, and reactive to light.   Neck:      Vascular: No JVD.      Trachea: No tracheal deviation.   Cardiovascular:      Rate and Rhythm: Normal rate and regular rhythm.      Heart sounds: Normal heart sounds. No murmur heard.  Pulmonary:      Effort: Pulmonary effort is normal. No respiratory distress.      Breath sounds: Normal breath sounds. No wheezing.   Abdominal:      General: Bowel sounds are normal.      Palpations: Abdomen is soft.      Tenderness: There is no abdominal tenderness.   Musculoskeletal:         General: No deformity. Normal range of motion.      Right upper arm: Tenderness present.      Left upper arm: Normal.        Arms:       Cervical back: Normal range of motion and neck supple.      Right knee: Tenderness present.      Left knee: Normal.   Skin:     General: Skin is warm and dry.      Coloration: Skin is not pale.      Findings: No erythema or rash.   Neurological:      Mental Status: She is alert and oriented to person, place, and time.      Cranial Nerves: No cranial nerve deficit.   Psychiatric:         Behavior: Behavior normal.         Thought Content: Thought content normal.         Procedures           ED Course  ED Course as of 03/28/24 2105   u Mar 28, 2024   1836 CT Cervical Spine Without Contrast [TK]   1836 CT Head Without Contrast [TK]   2047 XR Knee 3 View Right [TK]   2048 XR Shoulder 2+ View Right [TK]      ED Course User Index  [TK] Milind Taylor, PANenaC                                   XR Knee 3 View Right   Final Result       1.  Mild to moderate tricompartmental osteoarthritis.   2.  Joint space narrowing with osteophyte formation noted throughout the   joint.   3.  Mild chondrocalcinosis in the medial lateral compartments.   4.  Moderate sized right knee joint effusion.   5.  No  acute fracture.   6.  No acute dislocation.       This report was finalized on 3/28/2024 8:41 PM by Malcolm Rosales MD.          XR Shoulder 2+ View Right   Final Result   Impression:       1.  No acute fracture or dislocation.   2.  Moderate osteoarthritis at the right glenohumeral joint.   3.  Very mild hypertrophic changes at the right AC joint.   4.  Mild narrowed subacromial space.   5.  No foreign body.       This report was finalized on 3/28/2024 8:40 PM by Malcolm Rosales MD.          CT Cervical Spine Without Contrast   Final Result   1.  No acute fracture or traumatic malalignment.   2.  Multilevel degenerative changes.           This report was finalized on 3/28/2024 6:01 PM by Dr. Bird Donis MD.          CT Head Without Contrast   Final Result   1.  Stable exam demonstrating no CT evidence of acute intracranial   abnormality.   2.  Chronic sphenoid sinusitis.   3.  Senescent changes. Other nonacute findings above stable from   previous.           This report was finalized on 3/28/2024 5:59 PM by Dr. Bird Donis MD.                        Medical Decision Making  83-year-old female with past medical history of arthritis, dementia, hypertension, and gout presents to the emergency room via EMS after a fall she sustained at the nursing home.  Patient apparently fell while trying to get out of bed and struck her head on the nightstand and had unknown LOC.  Patient does take chronic anticoagulation.  Patient does complain of right shoulder pain and right knee pain.  Denies any neck pain, back pain, chest pain, or abdominal pain.  Denies any other complaints or concerns at this time.      Problems Addressed:  Effusion of right knee: complicated acute illness or injury  Fall, initial encounter: complicated acute illness or injury  Osteoarthritis of right glenohumeral joint: complicated acute illness or injury  Tricompartment osteoarthritis of right knee: complicated acute illness or injury    Amount and/or  Complexity of Data Reviewed  Radiology: ordered. Decision-making details documented in ED Course.        Final diagnoses:   Tricompartment osteoarthritis of right knee   Effusion of right knee   Osteoarthritis of right glenohumeral joint   Fall, initial encounter       ED Disposition  ED Disposition       ED Disposition   Discharge    Condition   Stable    Comment   --               Antwan Schaefer MD  [address] [place] [address]  [phone]    In 2 days           Medication List      No changes were made to your prescriptions during this visit.            Milind Taylor, PA-C  03/28/24 2102

## 2025-01-04 ENCOUNTER — LAB REQUISITION (OUTPATIENT)
Dept: LAB | Facility: HOSPITAL | Age: 85
End: 2025-01-04
Payer: MEDICARE

## 2025-01-04 DIAGNOSIS — R05.1 ACUTE COUGH: ICD-10-CM

## 2025-01-04 DIAGNOSIS — R50.9 FEVER, UNSPECIFIED: ICD-10-CM

## 2025-01-04 LAB
B PARAPERT DNA SPEC QL NAA+PROBE: NOT DETECTED
B PERT DNA SPEC QL NAA+PROBE: NOT DETECTED
C PNEUM DNA NPH QL NAA+NON-PROBE: NOT DETECTED
FLUAV SUBTYP SPEC NAA+PROBE: NOT DETECTED
FLUBV RNA ISLT QL NAA+PROBE: NOT DETECTED
HADV DNA SPEC NAA+PROBE: NOT DETECTED
HCOV 229E RNA SPEC QL NAA+PROBE: NOT DETECTED
HCOV HKU1 RNA SPEC QL NAA+PROBE: NOT DETECTED
HCOV NL63 RNA SPEC QL NAA+PROBE: NOT DETECTED
HCOV OC43 RNA SPEC QL NAA+PROBE: DETECTED
HMPV RNA NPH QL NAA+NON-PROBE: DETECTED
HPIV1 RNA ISLT QL NAA+PROBE: NOT DETECTED
HPIV2 RNA SPEC QL NAA+PROBE: NOT DETECTED
HPIV3 RNA NPH QL NAA+PROBE: NOT DETECTED
HPIV4 P GENE NPH QL NAA+PROBE: NOT DETECTED
M PNEUMO IGG SER IA-ACNC: NOT DETECTED
RHINOVIRUS RNA SPEC NAA+PROBE: NOT DETECTED
RSV RNA NPH QL NAA+NON-PROBE: NOT DETECTED
SARS-COV-2 RNA RESP QL NAA+PROBE: NOT DETECTED

## 2025-01-04 PROCEDURE — 0202U NFCT DS 22 TRGT SARS-COV-2: CPT | Performed by: NURSE PRACTITIONER
